# Patient Record
Sex: MALE | Race: WHITE | Employment: OTHER | URBAN - METROPOLITAN AREA
[De-identification: names, ages, dates, MRNs, and addresses within clinical notes are randomized per-mention and may not be internally consistent; named-entity substitution may affect disease eponyms.]

---

## 2017-01-14 RX ORDER — SIMVASTATIN 80 MG/1
TABLET, FILM COATED ORAL
Qty: 30 TAB | Refills: 1 | Status: SHIPPED | OUTPATIENT
Start: 2017-01-14 | End: 2017-06-19 | Stop reason: SDUPTHER

## 2017-06-19 ENCOUNTER — OFFICE VISIT (OUTPATIENT)
Dept: INTERNAL MEDICINE CLINIC | Age: 52
End: 2017-06-19

## 2017-06-19 VITALS
BODY MASS INDEX: 34.16 KG/M2 | SYSTOLIC BLOOD PRESSURE: 136 MMHG | HEART RATE: 67 BPM | RESPIRATION RATE: 16 BRPM | OXYGEN SATURATION: 97 % | TEMPERATURE: 98.1 F | WEIGHT: 205 LBS | HEIGHT: 65 IN | DIASTOLIC BLOOD PRESSURE: 81 MMHG

## 2017-06-19 DIAGNOSIS — E55.9 VITAMIN D DEFICIENCY: ICD-10-CM

## 2017-06-19 DIAGNOSIS — E78.1 HYPERTRIGLYCERIDEMIA: ICD-10-CM

## 2017-06-19 DIAGNOSIS — E66.9 NON MORBID OBESITY, UNSPECIFIED OBESITY TYPE: ICD-10-CM

## 2017-06-19 DIAGNOSIS — M1A.9XX0 CHRONIC GOUT INVOLVING TOE OF LEFT FOOT WITHOUT TOPHUS, UNSPECIFIED CAUSE: ICD-10-CM

## 2017-06-19 DIAGNOSIS — E78.00 HYPERCHOLESTEROLEMIA: Primary | ICD-10-CM

## 2017-06-19 RX ORDER — SIMVASTATIN 80 MG/1
TABLET, FILM COATED ORAL
Qty: 30 TAB | Refills: 5 | Status: SHIPPED | OUTPATIENT
Start: 2017-06-19 | End: 2018-02-13 | Stop reason: SDUPTHER

## 2017-06-19 NOTE — PROGRESS NOTES
HISTORY OF PRESENT ILLNESS  Nemesio Daniels is a 54 y.o. male. Pt. comes in after 9 months for f/u. Has a few medical problems. Has lost more weight and remains on zocor for hyperlipidemia. Med list and most recent labs/studies reviewed with pt. His most recent lipids were higher because he stopped taking his Zocor. BS was high too. A1c in office was 5. 3. Trying to be active physically including weightlifting. Reports pain in her right shoulder from time to time. He saw a chiropractor and feels better. No recent gout attacks. Vitamin D has been low in the past.  Needs refills and repeat labs. Reports no other new c/o. Cholesterol Problem   Pertinent negatives include no chest pain, no abdominal pain, no headaches and no shortness of breath. Shoulder Pain      Follow Up Chronic Condition   Pertinent negatives include no chest pain, no abdominal pain, no headaches and no shortness of breath. Review of Systems   Constitutional: Negative. Eyes: Negative for blurred vision. Respiratory: Negative for shortness of breath. Cardiovascular: Negative for chest pain and leg swelling. Gastrointestinal: Negative for abdominal pain. Genitourinary: Negative for dysuria. Musculoskeletal: Positive for joint pain. Negative for falls. Skin: Negative for rash. Neurological: Negative for dizziness, sensory change and headaches. Endo/Heme/Allergies: Negative. Negative for polydipsia. Psychiatric/Behavioral: Negative for depression. All other systems reviewed and are negative. Physical Exam   Constitutional: He is oriented to person, place, and time. He appears well-developed and well-nourished. No distress. Pleasant  Obese   HENT:   Head: Normocephalic and atraumatic. Mouth/Throat: Oropharynx is clear and moist.   Eyes: Conjunctivae are normal. No scleral icterus. Neck: Normal range of motion. Neck supple. No JVD present. No thyromegaly present.    Cardiovascular: Normal rate, regular rhythm, normal heart sounds and intact distal pulses. No murmur heard. Pulmonary/Chest: Effort normal and breath sounds normal. No respiratory distress. He has no wheezes. He has no rales. Abdominal: Soft. Bowel sounds are normal. He exhibits no distension. There is no tenderness. obese   Musculoskeletal: Normal range of motion. He exhibits no edema or tenderness. Neurological: He is alert and oriented to person, place, and time. Coordination normal.   Skin: Skin is warm and dry. No rash noted. Psychiatric: He has a normal mood and affect. His behavior is normal.   Nursing note and vitals reviewed. ASSESSMENT and PLAN  Sami Quinn was seen today for cholesterol problem, shoulder pain and follow up chronic condition. Diagnoses and all orders for this visit:    Hypercholesterolemia  -     METABOLIC PANEL, COMPREHENSIVE  -     LIPID PANEL  -     PSA - PROSTATE SPECIFIC AG    Hypertriglyceridemia  -     METABOLIC PANEL, COMPREHENSIVE  -     LIPID PANEL    Non morbid obesity, unspecified obesity type    Vitamin D deficiency  -     VITAMIN D, 25 HYDROXY    Chronic gout involving toe of left foot without tophus, unspecified cause  -     URIC ACID    Other orders  -     simvastatin (ZOCOR) 80 mg tablet; TAKE 1 TABLET BY MOUTH EVERY NIGHT AT BEDTIME      Follow-up Disposition:  Return in about 6 months (around 12/19/2017).    lab results and schedule of future lab studies reviewed with patient  reviewed diet, exercise and weight control  reviewed medications and side effects in detail  Advised him to warm up and cool down after weight lifting

## 2017-06-19 NOTE — PROGRESS NOTES
There are no preventive care reminders to display for this patient. Chief Complaint   Patient presents with    Cholesterol Problem    Shoulder Pain     Right shoulder       1. Have you been to the ER, urgent care clinic since your last visit? Hospitalized since your last visit? No    2. Have you seen or consulted any other health care providers outside of the [address] [place] [address] since your last visit? Include any pap smears or colon screening. No        Patient is accompanied by self I have received verbal consent from Luz Maria Angulo to discuss any/all medical information while they are present in the room.

## 2017-06-19 NOTE — MR AVS SNAPSHOT
Visit Information Date & Time Provider Department Dept. Phone Encounter #  
 6/19/2017 10:00 AM Christopher Larose, [facility] Internal Medicine [phone] [id number] Follow-up Instructions Return in about 6 months (around 12/19/2017). Upcoming Health Maintenance Date Due INFLUENZA AGE 9 TO ADULT 8/1/2017 COLONOSCOPY 9/1/2020 DTaP/Tdap/Td series (2 - Td) 10/15/2022 Allergies as of 6/19/2017  Review Complete On: 6/19/2017 By: Christopher Larose, DO No Known Allergies Current Immunizations  Reviewed on 12/7/2015 Name Date Influenza Vaccine Intradermal PF 11/24/2015 12:02 PM  
 Pneumococcal Polysaccharide (PPSV-23) 12/8/2015  9:39 AM  
 TDAP Vaccine 10/15/2012 Not reviewed this visit You Were Diagnosed With   
  
 Codes Comments Hypercholesterolemia    -  Primary ICD-10-CM: E78.00 ICD-9-CM: 272.0 Hypertriglyceridemia     ICD-10-CM: E78.1 ICD-9-CM: 272.1 Non morbid obesity, unspecified obesity type     ICD-10-CM: E66.9 ICD-9-CM: 278.00 Vitamin D deficiency     ICD-10-CM: E55.9 ICD-9-CM: 268.9 Chronic gout involving toe of left foot without tophus, unspecified cause     ICD-10-CM: M1A. 9XX0 
ICD-9-CM: 274.02 Vitals BP Pulse Temp Resp Height(growth percentile) Weight(growth percentile) 136/81 (BP 1 Location: Right arm, BP Patient Position: Sitting) 67 98.1 °F (36.7 °C) (Oral) 16 5' 5\" (1.651 m) 205 lb (93 kg) SpO2 BMI Smoking Status 97% 34.11 kg/m2 Former Smoker Vitals History BMI and BSA Data Body Mass Index Body Surface Area  
 34.11 kg/m 2 2.07 m 2 Preferred Pharmacy Pharmacy Name Phone [id number], [address] [place] [address] [phone] Your Updated Medication List  
  
   
This list is accurate as of: 6/19/17 10:34 AM.  Always use your most recent med list.  
  
  
  
  
 FISH  mg Cap Generic drug:  Omega-3 Fatty Acids Take  by mouth.  
  
 multivitamin tablet Commonly known as:  ONE A DAY Take 1 Tab by mouth daily. simvastatin 80 mg tablet Commonly known as:  ZOCOR  
TAKE 1 TABLET BY MOUTH EVERY NIGHT AT BEDTIME  
  
 VITAMIN D3 2,000 unit Tab Generic drug:  cholecalciferol (vitamin D3) Take  by mouth. 3 x weekly Prescriptions Sent to Pharmacy Refills  
 simvastatin (ZOCOR) 80 mg tablet 5 Sig: TAKE 1 TABLET BY MOUTH EVERY NIGHT AT BEDTIME Class: Normal  
 Pharmacy: [facility]/pharmacy #[address] [place] [address] #: [phone] We Performed the Following LIPID PANEL [62698 CPT(R)] METABOLIC PANEL, COMPREHENSIVE [50683 CPT(R)] PROSTATE SPECIFIC AG (PSA) B2406172 CPT(R)] URIC ACID [id number] CPT(R)] VITAMIN D, 25 HYDROXY [id number] CPT(R)] Follow-up Instructions Return in about 6 months (around 12/19/2017). Introducing [facility]! Dear Garima Patterson: 
Thank you for requesting a D square nv account. Our records indicate that you already have an active D square nv account. You can access your account anytime at [email or web address] Did you know that you can access your hospital and ER discharge instructions at any time in D square nv? You can also review all of your test results from your hospital stay or ER visit. Additional Information If you have questions, please visit the Frequently Asked Questions section of the D square nv website at [email or web address] Remember, D square nv is NOT to be used for urgent needs. For medical emergencies, dial 911. Now available from your iPhone and Android! Please provide this summary of care documentation to your next provider. Your primary care clinician is listed as Mikayla Leal. If you have any questions after today's visit, please call [phone].

## 2018-01-15 DIAGNOSIS — E55.9 VITAMIN D DEFICIENCY: ICD-10-CM

## 2018-02-13 ENCOUNTER — OFFICE VISIT (OUTPATIENT)
Dept: INTERNAL MEDICINE CLINIC | Age: 53
End: 2018-02-13

## 2018-02-13 VITALS
HEART RATE: 85 BPM | HEIGHT: 65 IN | BODY MASS INDEX: 36.06 KG/M2 | OXYGEN SATURATION: 96 % | WEIGHT: 216.4 LBS | SYSTOLIC BLOOD PRESSURE: 114 MMHG | DIASTOLIC BLOOD PRESSURE: 51 MMHG | TEMPERATURE: 98.8 F | RESPIRATION RATE: 18 BRPM

## 2018-02-13 DIAGNOSIS — E78.00 HYPERCHOLESTEROLEMIA: ICD-10-CM

## 2018-02-13 DIAGNOSIS — J06.9 URTI (ACUTE UPPER RESPIRATORY INFECTION): Primary | ICD-10-CM

## 2018-02-13 RX ORDER — METHYLPREDNISOLONE 4 MG/1
TABLET ORAL
Qty: 1 DOSE PACK | Refills: 0 | Status: SHIPPED | OUTPATIENT
Start: 2018-02-13 | End: 2018-07-24

## 2018-02-13 RX ORDER — DOXYCYCLINE 100 MG/1
100 TABLET ORAL 2 TIMES DAILY
Qty: 14 TAB | Refills: 0 | Status: SHIPPED | OUTPATIENT
Start: 2018-02-13 | End: 2018-07-24

## 2018-02-13 RX ORDER — SIMVASTATIN 80 MG/1
TABLET, FILM COATED ORAL
Qty: 30 TAB | Refills: 5 | Status: SHIPPED | OUTPATIENT
Start: 2018-02-13 | End: 2018-07-24 | Stop reason: SDUPTHER

## 2018-02-13 NOTE — PROGRESS NOTES
Health Maintenance Due   Topic Date Due    Influenza Age 5 to Adult  08/01/2017       Chief Complaint   Patient presents with    Cough     Fatigue,     Pneumonia     History of       1. Have you been to the ER, urgent care clinic since your last visit? Hospitalized since your last visit? No    2. Have you seen or consulted any other health care providers outside of the [address] [place] [address] since your last visit? Include any pap smears or colon screening. No    3) Do you have an Advance Directive on file? no    4) Are you interested in receiving information on Advance Directives? NO      Patient is accompanied by self I have received verbal consent from Ольга Bobby to discuss any/all medical information while they are present in the room.

## 2018-02-13 NOTE — PATIENT INSTRUCTIONS
Upper Respiratory Infection (Cold): Care Instructions  Your Care Instructions    An upper respiratory infection, or URI, is an infection of the nose, sinuses, or throat. URIs are spread by coughs, sneezes, and direct contact. The common cold is the most frequent kind of URI. The flu and sinus infections are other kinds of URIs. Almost all URIs are caused by viruses. Antibiotics won't cure them. But you can treat most infections with home care. This may include drinking lots of fluids and taking over-the-counter pain medicine. You will probably feel better in 4 to 10 days. The doctor has checked you carefully, but problems can develop later. If you notice any problems or new symptoms, get medical treatment right away. Follow-up care is a key part of your treatment and safety. Be sure to make and go to all appointments, and call your doctor if you are having problems. It's also a good idea to know your test results and keep a list of the medicines you take. How can you care for yourself at home? · To prevent dehydration, drink plenty of fluids, enough so that your urine is light yellow or clear like water. Choose water and other caffeine-free clear liquids until you feel better. If you have kidney, heart, or liver disease and have to limit fluids, talk with your doctor before you increase the amount of fluids you drink. · Take an over-the-counter pain medicine, such as acetaminophen (Tylenol), ibuprofen (Advil, Motrin), or naproxen (Aleve). Read and follow all instructions on the label. · Before you use cough and cold medicines, check the label. These medicines may not be safe for young children or for people with certain health problems. · Be careful when taking over-the-counter cold or flu medicines and Tylenol at the same time. Many of these medicines have acetaminophen, which is Tylenol. Read the labels to make sure that you are not taking more than the recommended dose.  Too much acetaminophen (Tylenol) can be harmful. · Get plenty of rest.  · Do not smoke or allow others to smoke around you. If you need help quitting, talk to your doctor about stop-smoking programs and medicines. These can increase your chances of quitting for good. When should you call for help? Call 911 anytime you think you may need emergency care. For example, call if:  ? · You have severe trouble breathing. ?Call your doctor now or seek immediate medical care if:  ? · You seem to be getting much sicker. ? · You have new or worse trouble breathing. ? · You have a new or higher fever. ? · You have a new rash. ? Watch closely for changes in your health, and be sure to contact your doctor if:  ? · You have a new symptom, such as a sore throat, an earache, or sinus pain. ? · You cough more deeply or more often, especially if you notice more mucus or a change in the color of your mucus. ? · You do not get better as expected. Where can you learn more? Go to http://honey-fatou.info/. Enter X090 in the search box to learn more about \"Upper Respiratory Infection (Cold): Care Instructions. \"  Current as of: May 12, 2017  Content Version: 11.4  © 8870-2745 ivWatch. Care instructions adapted under license by [employer] (which disclaims liability or warranty for this information). If you have questions about a medical condition or this instruction, always ask your healthcare professional. [place] [address] any warranty or liability for your use of this information. High Cholesterol: Care Instructions  Your Care Instructions    Cholesterol is a type of fat in your blood. It is needed for many body functions, such as making new cells. Cholesterol is made by your body. It also comes from food you eat. High cholesterol means that you have too much of the fat in your blood. This raises your risk of a heart attack and stroke.   LDL and HDL are part of your total cholesterol. LDL is the \"bad\" cholesterol. High LDL can raise your risk for heart disease, heart attack, and stroke. HDL is the \"good\" cholesterol. It helps clear bad cholesterol from the body. High HDL is linked with a lower risk of heart disease, heart attack, and stroke. Your cholesterol levels help your doctor find out your risk for having a heart attack or stroke. You and your doctor can talk about whether you need to lower your risk and what treatment is best for you. A heart-healthy lifestyle along with medicines can help lower your cholesterol and your risk. The way you choose to lower your risk will depend on how high your risk is for heart attack and stroke. It will also depend on how you feel about taking medicines. Follow-up care is a key part of your treatment and safety. Be sure to make and go to all appointments, and call your doctor if you are having problems. It's also a good idea to know your test results and keep a list of the medicines you take. How can you care for yourself at home? · Eat a variety of foods every day. Good choices include fruits, vegetables, whole grains (like oatmeal), dried beans and peas, nuts and seeds, soy products (like tofu), and fat-free or low-fat dairy products. · Replace butter, margarine, and hydrogenated or partially hydrogenated oils with olive and canola oils. (Canola oil margarine without trans fat is fine.)  · Replace red meat with fish, poultry, and soy protein (like tofu). · Limit processed and packaged foods like chips, crackers, and cookies. · Bake, broil, or steam foods. Don't diaz them. · Be physically active. Get at least 30 minutes of exercise on most days of the week. Walking is a good choice. You also may want to do other activities, such as running, swimming, cycling, or playing tennis or team sports. · Stay at a healthy weight or lose weight by making the changes in eating and physical activity listed above.  Losing just a small amount of weight, even 5 to 10 pounds, can reduce your risk for having a heart attack or stroke. · Do not smoke. When should you call for help? Watch closely for changes in your health, and be sure to contact your doctor if:  ? · You need help making lifestyle changes. ? · You have questions about your medicine. Where can you learn more? Go to http://honey-fatou.info/. Enter R932 in the search box to learn more about \"High Cholesterol: Care Instructions. \"  Current as of: September 21, 2016  Content Version: 11.4  © 5258-0971 [employer]ite[employer]. Care instructions adapted under license by Snacks[employer] (which disclaims liability or warranty for this information). If you have questions about a medical condition or this instruction, always ask your healthcare professional. [address] any warranty or liability for your use of this information.

## 2018-02-13 NOTE — PROGRESS NOTES
Subjective:     Chief Complaint   Patient presents with    Cough     Fatigue,     Pneumonia     History of       History of Present Illness    Josselin Browning is a 50y.o. year old male who is a patient of Dr. Ghazal Carter that presents today with complaints of cough and fatigue. He reports a hx of pneumonia with similar symptoms. He denies any fever. Onset 4 days ago. He denies any sick contacts. NAD. He denies wheezing or SOB.  +tan colored sputum. No other medical complaints today. He has a hx of hypercholesterolemia and is taking simvastatin. He is requesting a refill. No CP, SOB, GI, or  symptoms. Reviewed medications, recent lab work and imaging with patient. Pt reports compliance with medications. Current Outpatient Prescriptions on File Prior to Visit   Medication Sig Dispense Refill    Omega-3 Fatty Acids (FISH OIL) 300 mg cap Take  by mouth.  simvastatin (ZOCOR) 80 mg tablet TAKE 1 TABLET BY MOUTH EVERY NIGHT AT BEDTIME 30 Tab 5    multivitamin (ONE A DAY) tablet Take 1 Tab by mouth daily.  cholecalciferol, vitamin D3, (VITAMIN D3) 2,000 unit tab Take  by mouth. 3 x weekly       No current facility-administered medications on file prior to visit.         No Known Allergies   Past Medical History:   Diagnosis Date    Elevated PSA     Hypercholesterolemia     Seasonal allergies       Past Surgical History:   Procedure Laterality Date    ENDOSCOPY, COLON, DIAGNOSTIC      10, due 15 v 20      Social History   Substance Use Topics    Smoking status: Former Smoker    Smokeless tobacco: Never Used    Alcohol use 1.5 oz/week     3 Glasses of wine per week      Comment: 3 per week      Family History   Problem Relation Age of Onset    Cancer Father      pancreatic    Cancer Paternal Grandmother      lung        Objective:     Vitals:    02/13/18 1511   BP: 114/51   Pulse: 85   Resp: 18   Temp: 98.8 °F (37.1 °C)   TempSrc: Oral   SpO2: 96%   Weight: 216 lb 6.4 oz (98.2 kg) Height: 5' 5\" (1.651 m)       Review of Systems   Constitutional: Positive for malaise/fatigue. HENT: Positive for congestion. Eyes: Negative. Respiratory: Positive for cough and sputum production. Negative for shortness of breath and wheezing. Cardiovascular: Negative. Gastrointestinal: Negative. Genitourinary: Negative. Musculoskeletal: Negative. Skin: Negative. Neurological: Negative. Psychiatric/Behavioral: Negative. Physical Exam   Constitutional: He is oriented to person, place, and time. He appears well-developed and well-nourished. No distress. Well-appearing  male. NAD   HENT:   Head: Normocephalic and atraumatic. Right Ear: External ear normal.   Left Ear: External ear normal.   Mouth/Throat: Oropharynx is clear and moist.   Eyes: EOM are normal. Pupils are equal, round, and reactive to light. Neck: Normal range of motion. Neck supple. Cardiovascular: Normal rate, regular rhythm and normal heart sounds. Pulmonary/Chest: Effort normal. No respiratory distress. He has no wheezes. He has rhonchi in the right lower field. Dry cough. Abdominal: Soft. Bowel sounds are normal. He exhibits no distension. There is no tenderness. Musculoskeletal: Normal range of motion. He exhibits no edema, tenderness or deformity. Neurological: He is alert and oriented to person, place, and time. Skin: Skin is warm and dry. No rash noted. He is not diaphoretic. No erythema. No pallor. Psychiatric: He has a normal mood and affect. His behavior is normal.   Nursing note and vitals reviewed. Assessment/ Plan:   Diagnoses and all orders for this visit:    1. URTI (acute upper respiratory infection)   Will order  -     doxycycline (ADOXA) 100 mg tablet; Take 1 Tab by mouth two (2) times a day. -     methylPREDNISolone (MEDROL DOSEPACK) 4 mg tablet; Use as directed    2.  Hypercholesterolemia   Will order  -     simvastatin (ZOCOR) 80 mg tablet; TAKE 1 TABLET BY MOUTH EVERY NIGHT AT BEDTIME    Patient's plan of care has been reviewed with them. Patient and/or family have verbally conveyed their understanding and agreement of the patient's signs, symptoms, diagnosis, treatment and prognosis and additionally agree to follow up as recommended or return to [facility] Internal Medicine should their condition change prior to follow-up. Discharge instructions have also been provided to the patient with some educational information regarding their diagnosis as well a list of reasons why they would want to return to the office prior to their follow-up appointment should their condition change.     Follow-up with Dr. Eduardo Tobias as scheduled

## 2018-02-13 NOTE — MR AVS SNAPSHOT
[address] [facility] [address] 
[phone] Patient: Gregorio Sanchez MRN: [id number] :1965 Visit Information Date & Time Provider Department Dept. Phone Encounter #  
 2018  2:40 PM Brayan Guillen NP [facility] Internal Medicine [phone] [id number] Your Appointments 3/13/2018  2:00 PM  
PHYSICAL PRE OP with Amy Rosa DO [facility] Internal Medicine ([address]) Appt Note: CPE; [address] [facility] [address] [facility] [address] [place] [id number] [phone]  
  
   
 [address] [facility] [address] Upcoming Health Maintenance Date Due Influenza Age 5 to Adult 2017 COLONOSCOPY 2020 DTaP/Tdap/Td series (2 - Td) 10/15/2022 Allergies as of 2018  Review Complete On: 2018 By: Brayan Guillen NP No Known Allergies Current Immunizations  Reviewed on 2015 Name Date Influenza Vaccine Intradermal PF 2015 12:02 PM  
 Pneumococcal Polysaccharide (PPSV-23) 2015  9:39 AM  
 TDAP Vaccine 10/15/2012 Not reviewed this visit You Were Diagnosed With   
  
 Codes Comments URTI (acute upper respiratory infection)    -  Primary ICD-10-CM: J06.9 ICD-9-CM: 465.9 Hypercholesterolemia     ICD-10-CM: E78.00 ICD-9-CM: 272.0 Vitals BP Pulse Temp Resp Height(growth percentile) Weight(growth percentile) 114/51 (BP 1 Location: Left arm, BP Patient Position: Sitting) 85 98.8 °F (37.1 °C) (Oral) 18 5' 5\" (1.651 m) 216 lb 6.4 oz (98.2 kg) SpO2 BMI Smoking Status 96% 36.01 kg/m2 Former Smoker Vitals History BMI and BSA Data Body Mass Index Body Surface Area 36.01 kg/m 2 2.12 m 2 Preferred Pharmacy Pharmacy Name Phone [facility]/PHARMACY #[id number] [phone] Your Updated Medication List  
  
   
This list is accurate as of: 2/13/18  3:33 PM.  Always use your most recent med list.  
  
  
  
  
 doxycycline 100 mg tablet Commonly known as:  ADOXA Take 1 Tab by mouth two (2) times a day. FISH  mg Cap Generic drug:  Omega-3 Fatty Acids Take  by mouth.  
  
 methylPREDNISolone 4 mg tablet Commonly known as:  Valere Cera Use as directed  
  
 multivitamin tablet Commonly known as:  ONE A DAY Take 1 Tab by mouth daily. simvastatin 80 mg tablet Commonly known as:  ZOCOR  
TAKE 1 TABLET BY MOUTH EVERY NIGHT AT BEDTIME  
  
 VITAMIN D3 2,000 unit Tab Generic drug:  cholecalciferol (vitamin D3) Take  by mouth. 3 x weekly Prescriptions Sent to Pharmacy Refills  
 doxycycline (ADOXA) 100 mg tablet 0 Sig: Take 1 Tab by mouth two (2) times a day. Class: Normal  
 Pharmacy: [facility]/pharmacy #[address] [place] [address] Ph #: [phone] Route: Oral  
 methylPREDNISolone (MEDROL DOSEPACK) 4 mg tablet 0 Sig: Use as directed Class: Normal  
 Pharmacy: [facility]/pharmacy #[address] [place] [address] Ph #: [phone]  
 simvastatin (ZOCOR) 80 mg tablet 5 Sig: TAKE 1 TABLET BY MOUTH EVERY NIGHT AT BEDTIME Class: Normal  
 Pharmacy: [facility]/pharmacy #[address] [place] [address] Ph #: [phone] [facility]! Dear Kvng Godwin: 
Thank you for requesting a DanceOn account. Our records indicate that you already have an active DanceOn account. You can access your account anytime at [email or web address] Did you know that you can access your hospital and ER discharge instructions at any time in DanceOn? You can also review all of your test results from your hospital stay or ER visit. Additional Information If you have questions, please visit the Frequently Asked Questions section of the INBEP website at [email or web address] Remember, INBEP is NOT to be used for urgent needs. For medical emergencies, dial 911. Now available from your iPhone and Android! Please provide this summary of care documentation to your next provider. Your primary care clinician is listed as Ce Marie. If you have any questions after today's visit, please call [phone].

## 2018-07-24 ENCOUNTER — OFFICE VISIT (OUTPATIENT)
Dept: INTERNAL MEDICINE CLINIC | Age: 53
End: 2018-07-24

## 2018-07-24 VITALS
OXYGEN SATURATION: 97 % | RESPIRATION RATE: 20 BRPM | SYSTOLIC BLOOD PRESSURE: 126 MMHG | TEMPERATURE: 96.9 F | HEIGHT: 65 IN | DIASTOLIC BLOOD PRESSURE: 77 MMHG | HEART RATE: 64 BPM | WEIGHT: 210 LBS | BODY MASS INDEX: 34.99 KG/M2

## 2018-07-24 DIAGNOSIS — Z12.83 SKIN CANCER SCREENING: ICD-10-CM

## 2018-07-24 DIAGNOSIS — Z12.11 SCREENING FOR COLON CANCER: ICD-10-CM

## 2018-07-24 DIAGNOSIS — E78.00 HYPERCHOLESTEROLEMIA: Primary | ICD-10-CM

## 2018-07-24 DIAGNOSIS — E66.09 CLASS 1 OBESITY DUE TO EXCESS CALORIES WITHOUT SERIOUS COMORBIDITY WITH BODY MASS INDEX (BMI) OF 34.0 TO 34.9 IN ADULT: ICD-10-CM

## 2018-07-24 DIAGNOSIS — G47.30 SLEEP APNEA, UNSPECIFIED TYPE: ICD-10-CM

## 2018-07-24 RX ORDER — SIMVASTATIN 80 MG/1
TABLET, FILM COATED ORAL
Qty: 30 TAB | Refills: 5 | Status: SHIPPED | OUTPATIENT
Start: 2018-07-24 | End: 2019-08-02 | Stop reason: SDUPTHER

## 2018-07-24 NOTE — PROGRESS NOTES
Subjective:     Chief Complaint   Patient presents with    Complete Physical    Results     lab results. History of Present Illness    Maximo Hernandez is a 50y.o. year old male who is a patient of Dr. Ifeoma Serrano that presents today for follow-up. Pt has a hx of hypercholesterolemia. He is labs from yesterday show elevated total cholesterol, LDL, and triglycerides. He states he has not been taking his simvastatin as prescribed. He is concerned about his liver function. AST and ALT were both WNL. Patient educated on the importance of taking his statin along with diet and exercise. He reports being concerned about his liver due to his father dying at 64 from liver and pancreatic cancer. He also states that his wife has been telling him that he gasps for air while he is sleeping. He denies snoring. He denies a hx of sleep apnea. NAD. He denies any other new complaints at this time. No CP, SOB, GI, or  symptoms. Reviewed medications, recent lab work and imaging with patient. Current Outpatient Prescriptions on File Prior to Visit   Medication Sig Dispense Refill    Omega-3 Fatty Acids (FISH OIL) 300 mg cap Take  by mouth.  multivitamin (ONE A DAY) tablet Take 1 Tab by mouth daily.  cholecalciferol, vitamin D3, (VITAMIN D3) 2,000 unit tab Take  by mouth. 3 x weekly       No current facility-administered medications on file prior to visit.         No Known Allergies   Past Medical History:   Diagnosis Date    Elevated PSA     Hypercholesterolemia     Seasonal allergies       Past Surgical History:   Procedure Laterality Date    ENDOSCOPY, COLON, DIAGNOSTIC      10, due 15 v 20      Social History   Substance Use Topics    Smoking status: Former Smoker     Quit date: 7/24/2003    Smokeless tobacco: Never Used    Alcohol use 1.5 oz/week     3 Glasses of wine per week      Comment: 3 per week      Family History   Problem Relation Age of Onset    Cancer Father      pancreatic  Cancer Paternal Grandmother      lung        Objective:     Vitals:    07/24/18 1335   BP: 126/77   Pulse: 64   Resp: 20   Temp: 96.9 °F (36.1 °C)   TempSrc: Oral   SpO2: 97%   Weight: 210 lb (95.3 kg)   Height: 5' 5\" (1.651 m)       Review of Systems   Constitutional: Negative. HENT: Negative. Eyes: Negative. Respiratory: Negative. Cardiovascular: Negative. Gastrointestinal: Negative. Genitourinary: Negative. Musculoskeletal: Negative. Skin: Negative. Neurological: Negative. Psychiatric/Behavioral: Negative. Physical Exam   Constitutional: He is oriented to person, place, and time. He appears well-developed and well-nourished. No distress. Well-appearing obese  male. NAD   HENT:   Head: Normocephalic and atraumatic. Eyes: Conjunctivae and EOM are normal. Pupils are equal, round, and reactive to light. Neck: Normal range of motion. Neck supple. Cardiovascular: Normal rate, regular rhythm and normal heart sounds. Pulmonary/Chest: Effort normal and breath sounds normal. No respiratory distress. He has no wheezes. Abdominal: Soft. Bowel sounds are normal. He exhibits no distension. There is no tenderness. Musculoskeletal: Normal range of motion. He exhibits no edema, tenderness or deformity. Neurological: He is alert and oriented to person, place, and time. Skin: Skin is warm and dry. No rash noted. He is not diaphoretic. No erythema. No pallor. Psychiatric: He has a normal mood and affect. His behavior is normal.   Nursing note and vitals reviewed. Assessment/ Plan:   Diagnoses and all orders for this visit:    1. Hypercholesterolemia   Will order  -     simvastatin (ZOCOR) 80 mg tablet; TAKE 1 TABLET BY MOUTH EVERY NIGHT AT BEDTIME   Recommend that patient watch diet for fatty foods and exercise as tolerated.     Encourage patient to lose weight, losing 5-10 pounds can lower triglycerides by 20%, cut sugar, increase fiber intake, limit alcohol, exercise as tolerated, and cut back on starches. 2. Screening for colon cancer   Will order  -     [facility]    3. Skin cancer screening   Will order  -     REFERRAL TO DERMATOLOGY    4. Sleep apnea, unspecified type   Will order  -     REFERRAL TO SLEEP STUDIES    5. Class 1 obesity due to excess calories without serious comorbidity with body mass index (BMI) of 34.0 to 34.9 in adult   1. Consume 3 meals per day, do not skip meals. 2. Chose low fat, portion controlled foods for snack and desserts such as low fat chocolate pudding, low fat flavored yogurt, 100 calorie snack packs, etc.   3. Increase moderate intensity exercise to 30 minutes at least 5 times per week. 4. Read nutrition facts labels to identify foods that are lean, extra lean and low fat  The patient is asked to make an attempt to improve diet and exercise patterns to aid in medical management of this problem   Encourage patient to eat a healther Mediterranean style diet with 55% or less of calories from carbohydrates. Try to eat more vegetables, whole fruit, nuts, whole grains, yogurt and less refined grains. Eat less red meat. Chicken and fish would be good protein sources. Aim to eat less than 45 grams of carbohydrates per meal.    Patient's plan of care has been reviewed with them. Patient and/or family have verbally conveyed their understanding and agreement of the patient's signs, symptoms, diagnosis, treatment and prognosis and additionally agree to follow up as recommended or return to [facility] should their condition change prior to follow-up. Discharge instructions have also been provided to the patient with some educational information regarding their diagnosis as well a list of reasons why they would want to return to the office prior to their follow-up appointment should their condition change. Follow-up with Dr. Ifeoma Serrano in 6 months.

## 2018-07-24 NOTE — PATIENT INSTRUCTIONS
Colon Cancer Screening: Care Instructions  Your Care Instructions    Colorectal cancer occurs in the colon or rectum. That's the lower part of your digestive system. It is the second-leading cause of cancer deaths in the [place]. It often starts with small growths called polyps in the colon or rectum. Polyps are usually found with screening tests. Depending on the type of test, any polyps found may be removed during the tests. Colorectal cancer usually does not cause symptoms at first. But regular tests can help find it early, before it spreads and becomes harder to treat. Experts advise routine tests for colon cancer for people starting at age 48. And they advise people with a higher risk of colon cancer to get tested sooner. Talk with your doctor about when you should start testing. Discuss which tests you need. Follow-up care is a key part of your treatment and safety. Be sure to make and go to all appointments, and call your doctor if you are having problems. It's also a good idea to know your test results and keep a list of the medicines you take. What are the main screening tests for colon cancer? · Stool tests. These include the fecal immunochemical test (FIT) and the fecal occult blood test (FOBT). These tests check stool samples for signs of cancer. If your test is positive, you will need to have a colonoscopy. · Sigmoidoscopy. This test lets your doctor look at the lining of your rectum and the lowest part of your colon. Your doctor uses a lighted tube called a sigmoidoscope. This test can't find cancers or polyps in the upper part of your colon. In some cases, polyps that are found can be removed. But if your doctor finds polyps, you will need to have a colonoscopy to check the upper part of your colon. · Colonoscopy. This test lets your doctor look at the lining of your rectum and your entire colon. The doctor uses a thin, flexible tool called a colonoscope.  It can also be used to remove polyps or get a tissue sample (biopsy). What tests do you need? The following guidelines are for people age 48 and over who are not at high risk for colorectal cancer. You may have at least one of these tests as directed by your doctor. · Fecal immunochemical test (FIT) or fecal occult blood test (FOBT) every year  · Sigmoidoscopy every 5 years  · Colonoscopy every 10 years  If you are age 68 to 80, you can work with your doctor to decide if screening is a good option. If you are age 80 or older, your doctor will likely advise that screening is not helpful. Talk with your doctor about when you need to be tested. And discuss which tests are right for you. Your doctor may recommend earlier or more frequent testing if you:  · Have had colorectal cancer before. · Have had colon polyps. · Have symptoms of colorectal cancer. These include blood in your stool and changes in your bowel habits. · Have a parent, brother or sister, or child with colon polyps or colorectal cancer. · Have a bowel disease. This includes ulcerative colitis and Crohn's disease. · Have a rare polyp syndrome that runs in families, such as familial adenomatous polyposis (FAP). · Have had radiation treatments to the belly or pelvis. When should you call for help? Watch closely for changes in your health, and be sure to contact your doctor if:    · You have any changes in your bowel habits.     · You have any problems. Where can you learn more? Go to http://honey-fatou.info/. Enter M541 in the search box to learn more about \"Colon Cancer Screening: Care Instructions. \"  Current as of: May 12, 2017  Content Version: 11.7  © 6373-9406 [employer]. Care instructions adapted under license by [employer] (which disclaims liability or warranty for this information).  If you have questions about a medical condition or this instruction, always ask your healthcare professional. Carly Naidu disclaims any warranty or liability for your use of this information. High Cholesterol: Care Instructions  Your Care Instructions    Cholesterol is a type of fat in your blood. It is needed for many body functions, such as making new cells. Cholesterol is made by your body. It also comes from food you eat. High cholesterol means that you have too much of the fat in your blood. This raises your risk of a heart attack and stroke. LDL and HDL are part of your total cholesterol. LDL is the \"bad\" cholesterol. High LDL can raise your risk for heart disease, heart attack, and stroke. HDL is the \"good\" cholesterol. It helps clear bad cholesterol from the body. High HDL is linked with a lower risk of heart disease, heart attack, and stroke. Your cholesterol levels help your doctor find out your risk for having a heart attack or stroke. You and your doctor can talk about whether you need to lower your risk and what treatment is best for you. A heart-healthy lifestyle along with medicines can help lower your cholesterol and your risk. The way you choose to lower your risk will depend on how high your risk is for heart attack and stroke. It will also depend on how you feel about taking medicines. Follow-up care is a key part of your treatment and safety. Be sure to make and go to all appointments, and call your doctor if you are having problems. It's also a good idea to know your test results and keep a list of the medicines you take. How can you care for yourself at home? · Eat a variety of foods every day. Good choices include fruits, vegetables, whole grains (like oatmeal), dried beans and peas, nuts and seeds, soy products (like tofu), and fat-free or low-fat dairy products. · Replace butter, margarine, and hydrogenated or partially hydrogenated oils with olive and canola oils. (Canola oil margarine without trans fat is fine.)  · Replace red meat with fish, poultry, and soy protein (like tofu).   · Limit processed and packaged foods like chips, crackers, and cookies. · Bake, broil, or steam foods. Don't diaz them. · Be physically active. Get at least 30 minutes of exercise on most days of the week. Walking is a good choice. You also may want to do other activities, such as running, swimming, cycling, or playing tennis or team sports. · Stay at a healthy weight or lose weight by making the changes in eating and physical activity listed above. Losing just a small amount of weight, even 5 to 10 pounds, can reduce your risk for having a heart attack or stroke. · Do not smoke. When should you call for help? Watch closely for changes in your health, and be sure to contact your doctor if:    · You need help making lifestyle changes.     · You have questions about your medicine. Where can you learn more? Go to http://honeyCap Thatfatou.info/. Enter X985 in the search box to learn more about \"High Cholesterol: Care Instructions. \"  Current as of: May 10, 2017  Content Version: 11.7  © 8682-0849 [employer]. Care instructions adapted under license by [employer] (which disclaims liability or warranty for this information). If you have questions about a medical condition or this instruction, always ask your healthcare professional. [address] any warranty or liability for your use of this information. Learning About High Cholesterol  What is high cholesterol? Cholesterol is a type of fat in your blood. It is needed for many body functions, such as making new cells. Cholesterol is made by your body. It also comes from food you eat. If you have too much cholesterol, it starts to build up in your arteries. This is called hardening of the arteries, or atherosclerosis. High cholesterol raises your risk of a heart attack and stroke. There are different types of cholesterol. LDL is the \"bad\" cholesterol.  High LDL can raise your risk for heart disease, heart attack, and stroke. HDL is the \"good\" cholesterol. High HDL is linked with a lower risk for heart disease, heart attack, and stroke. Your cholesterol levels help your doctor find out your risk for having a heart attack or stroke. How can you prevent high cholesterol? A heart-healthy lifestyle can help you prevent high cholesterol. This lifestyle helps lower your risk for a heart attack and stroke. · Eat heart-healthy foods. ¨ Eat fruits, vegetables, whole grains (like oatmeal), dried beans and peas, nuts and seeds, soy products (like tofu), and fat-free or low-fat dairy products. ¨ Replace butter, margarine, and hydrogenated or partially hydrogenated oils with olive and canola oils. (Canola oil margarine without trans fat is fine.)  ¨ Replace red meat with fish, poultry, and soy protein (like tofu). ¨ Limit processed and packaged foods like chips, crackers, and cookies. · Be active. Exercise can improve your cholesterol level. Get at least 30 minutes of exercise on most days of the week. Walking is a good choice. You also may want to do other activities, such as running, swimming, cycling, or playing tennis or team sports. · Stay at a healthy weight. Lose weight if you need to. · Don't smoke. If you need help quitting, talk to your doctor about stop-smoking programs and medicines. These can increase your chances of quitting for good. How is high cholesterol treated? The goal of treatment is to reduce your chances of having a heart attack or stroke. The goal is not to lower your cholesterol numbers only. · You may make lifestyle changes, such as eating healthy foods, not smoking, losing weight, and being more active. · You may have to take medicine. Follow-up care is a key part of your treatment and safety. Be sure to make and go to all appointments, and call your doctor if you are having problems. It's also a good idea to know your test results and keep a list of the medicines you take.   Where can you learn more? Go to http://honey-fatou.info/. Enter U216 in the search box to learn more about \"Learning About High Cholesterol. \"  Current as of: May 10, 2017  Content Version: 11.7  © 8900-8485 [employer]. Care instructions adapted under license by hike (which disclaims liability or warranty for this information). If you have questions about a medical condition or this instruction, always ask your healthcare professional. [place] [address] any warranty or liability for your use of this information.

## 2018-07-24 NOTE — PROGRESS NOTES
Chief Complaint   Patient presents with    Complete Physical    Results     lab results. 1. Have you been to the ER, urgent care clinic since your last visit? Hospitalized since your last visit? No    2. Have you seen or consulted any other health care providers outside of the [address] [place] [address] since your last visit? Include any pap smears or colon screening.  No

## 2018-07-24 NOTE — MR AVS SNAPSHOT
[address] [facility] [address] [place] [address] 
[phone] Patient: Young Rico MRN: [id number] :1965 Visit Information Date & Time Provider Department Dept. Phone Encounter #  
 2018  1:20 PM Gui Tillman, [address] [facility] [phone] [id number] Upcoming Health Maintenance Date Due Influenza Age 5 to Adult 2018 COLONOSCOPY 2020 DTaP/Tdap/Td series (2 - Td) 10/15/2022 Allergies as of 2018  Review Complete On: 2018 By: Gui Tillman NP No Known Allergies Current Immunizations  Reviewed on 2015 Name Date Influenza Vaccine Intradermal PF 2015 12:02 PM  
 Pneumococcal Polysaccharide (PPSV-23) 2015  9:39 AM  
 TDAP Vaccine 10/15/2012 Not reviewed this visit You Were Diagnosed With   
  
 Codes Comments Hypercholesterolemia    -  Primary ICD-10-CM: E78.00 ICD-9-CM: 272.0 Screening for colon cancer     ICD-10-CM: Z12.11 ICD-9-CM: V76.51 Skin cancer screening     ICD-10-CM: Z12.83 ICD-9-CM: V76.43 Sleep apnea, unspecified type     ICD-10-CM: G47.30 ICD-9-CM: 780.57 Vitals BP Pulse Temp Resp Height(growth percentile) Weight(growth percentile) 126/77 (BP 1 Location: Right arm, BP Patient Position: Sitting) 64 96.9 °F (36.1 °C) (Oral) 20 5' 5\" (1.651 m) 210 lb (95.3 kg) SpO2 BMI Smoking Status 97% 34.95 kg/m2 Former Smoker BMI and BSA Data Body Mass Index Body Surface Area 34.95 kg/m 2 2.09 m 2 Preferred Pharmacy Pharmacy Name Phone [id number] [address] [phone] Your Updated Medication List  
  
   
This list is accurate as of 18  2:19 PM.  Always use your most recent med list.  
  
  
  
  
 FISH  mg Cap Generic drug:  Omega-3 Fatty Acids Take  by mouth.  
  
 multivitamin tablet Commonly known as:  ONE A DAY Take 1 Tab by mouth daily. simvastatin 80 mg tablet Commonly known as:  ZOCOR  
TAKE 1 TABLET BY MOUTH EVERY NIGHT AT BEDTIME  
  
 VITAMIN D3 2,000 unit Tab Generic drug:  cholecalciferol (vitamin D3) Take  by mouth. 3 x weekly Prescriptions Sent to Pharmacy Refills  
 simvastatin (ZOCOR) 80 mg tablet 5 Sig: TAKE 1 TABLET BY MOUTH EVERY NIGHT AT BEDTIME Class: Normal  
 Pharmacy: [facility] [place] [address], [address] [place] [address] #: [phone] We Performed the Following REFERRAL TO DERMATOLOGY [REF19 Custom] REFERRAL TO GASTROENTEROLOGY [FNS60 Custom] REFERRAL TO SLEEP STUDIES [REF99 Custom] Referral Information Referral ID Referred By Referred To  
  
 [id number] [facility] [address] [place] [address] [phone] [place] [address] Renetta Peterson Visits Status Start Date End Date 1 New Request 7/24/18 7/24/19 If your referral has a status of pending review or denied, additional information will be sent to support the outcome of this decision. Referral ID Referred By Referred To  
 [id number] [facility] [address] Phone: [phone] Fax: [phone] Visits Status Start Date End Date 1 New Request 7/24/18 7/24/19 If your referral has a status of pending review or denied, additional information will be sent to support the outcome of this decision. Referral ID Referred By Referred To  
 [id number]  W Nyasia Peterson MD  
   [address] [place] [address] Phone: [phone] Fax: [phone] Visits Status Start Date End Date 1 New Request 7/24/18 7/24/19 If your referral has a status of pending review or denied, additional information will be sent to support the outcome of this decision. Introducing [facility]! Dear Eloise Jennings: 
Thank you for requesting a [employer] account. Our records indicate that you already have an active [employer] account. You can access your account anytime at [email or web address] Did you know that you can access your hospital and ER discharge instructions at any time in [employer]? You can also review all of your test results from your hospital stay or ER visit. Additional Information If you have questions, please visit the Frequently Asked Questions section of the [employer] website at [email or web address] Remember, [employer] is NOT to be used for urgent needs. For medical emergencies, dial 911. Now available from your iPhone and Android! Please provide this summary of care documentation to your next provider. Your primary care clinician is listed as Sana Mack. If you have any questions after today's visit, please call [phone].

## 2018-07-25 LAB — 1,25(OH)2D3 SERPL-MCNC: 40 PG/ML (ref 19.9–79.3)

## 2018-08-02 DIAGNOSIS — E78.00 ELEVATED CHOLESTEROL: ICD-10-CM

## 2018-08-02 DIAGNOSIS — R73.03 PREDIABETES: Primary | ICD-10-CM

## 2019-02-14 ENCOUNTER — OFFICE VISIT (OUTPATIENT)
Dept: INTERNAL MEDICINE CLINIC | Age: 54
End: 2019-02-14

## 2019-02-14 VITALS
SYSTOLIC BLOOD PRESSURE: 123 MMHG | RESPIRATION RATE: 16 BRPM | WEIGHT: 219.6 LBS | HEART RATE: 59 BPM | HEIGHT: 65 IN | BODY MASS INDEX: 36.59 KG/M2 | OXYGEN SATURATION: 97 % | DIASTOLIC BLOOD PRESSURE: 61 MMHG | TEMPERATURE: 97 F

## 2019-02-14 DIAGNOSIS — E55.9 VITAMIN D DEFICIENCY: ICD-10-CM

## 2019-02-14 DIAGNOSIS — E66.01 CLASS 2 SEVERE OBESITY WITH SERIOUS COMORBIDITY AND BODY MASS INDEX (BMI) OF 36.0 TO 36.9 IN ADULT, UNSPECIFIED OBESITY TYPE (HCC): ICD-10-CM

## 2019-02-14 DIAGNOSIS — E78.1 HYPERTRIGLYCERIDEMIA: ICD-10-CM

## 2019-02-14 DIAGNOSIS — E78.00 HYPERCHOLESTEROLEMIA: ICD-10-CM

## 2019-02-14 DIAGNOSIS — E34.9 HYPOTESTOSTERONISM: ICD-10-CM

## 2019-02-14 DIAGNOSIS — J40 BRONCHITIS: Primary | ICD-10-CM

## 2019-02-14 RX ORDER — PREDNISONE 5 MG/1
TABLET ORAL
Qty: 21 TAB | Refills: 0 | Status: SHIPPED | OUTPATIENT
Start: 2019-02-14 | End: 2019-04-22 | Stop reason: ALTCHOICE

## 2019-02-14 RX ORDER — ALBUTEROL SULFATE 90 UG/1
1 AEROSOL, METERED RESPIRATORY (INHALATION)
Qty: 1 INHALER | Refills: 1 | Status: SHIPPED | OUTPATIENT
Start: 2019-02-14 | End: 2022-02-02

## 2019-02-14 NOTE — PROGRESS NOTES
Health Maintenance Due   Topic Date Due    Shingrix Vaccine Age 49> (1 of 2) 12/08/2017    Influenza Age 5 to Adult  08/01/2018       Chief Complaint   Patient presents with    Cough       1. Have you been to the ER, urgent care clinic since your last visit? Hospitalized since your last visit? No    2. Have you seen or consulted any other health care providers outside of the [address] [place] [address] since your last visit? Include any pap smears or colon screening. No    3) Do you have an Advance Directive on file? no    4) Are you interested in receiving information on Advance Directives? NO      Patient is accompanied by self I have received verbal consent from Meena Paula to discuss any/all medical information while they are present in the room.

## 2019-02-15 NOTE — PATIENT INSTRUCTIONS
Bronchitis: Care Instructions  Your Care Instructions    Bronchitis is inflammation of the bronchial tubes, which carry air to the lungs. The tubes swell and produce mucus, or phlegm. The mucus and inflamed bronchial tubes make you cough. You may have trouble breathing. Most cases of bronchitis are caused by viruses like those that cause colds. Antibiotics usually do not help and they may be harmful. Bronchitis usually develops rapidly and lasts about 2 to 3 weeks in otherwise healthy people. Follow-up care is a key part of your treatment and safety. Be sure to make and go to all appointments, and call your doctor if you are having problems. It's also a good idea to know your test results and keep a list of the medicines you take. How can you care for yourself at home? · Take all medicines exactly as prescribed. Call your doctor if you think you are having a problem with your medicine. · Get some extra rest.  · Take an over-the-counter pain medicine, such as acetaminophen (Tylenol), ibuprofen (Advil, Motrin), or naproxen (Aleve) to reduce fever and relieve body aches. Read and follow all instructions on the label. · Do not take two or more pain medicines at the same time unless the doctor told you to. Many pain medicines have acetaminophen, which is Tylenol. Too much acetaminophen (Tylenol) can be harmful. · Take an over-the-counter cough medicine that contains dextromethorphan to help quiet a dry, hacking cough so that you can sleep. Avoid cough medicines that have more than one active ingredient. Read and follow all instructions on the label. · Breathe moist air from a humidifier, hot shower, or sink filled with hot water. The heat and moisture will thin mucus so you can cough it out. · Do not smoke. Smoking can make bronchitis worse. If you need help quitting, talk to your doctor about stop-smoking programs and medicines. These can increase your chances of quitting for good.   When should you call for help? Call 911 anytime you think you may need emergency care. For example, call if:    · You have severe trouble breathing.    Call your doctor now or seek immediate medical care if:    · You have new or worse trouble breathing.     · You cough up dark brown or bloody mucus (sputum).     · You have a new or higher fever.     · You have a new rash.    Watch closely for changes in your health, and be sure to contact your doctor if:    · You cough more deeply or more often, especially if you notice more mucus or a change in the color of your mucus.     · You are not getting better as expected. Where can you learn more? Go to http://honey-fatou.info/. Enter H333 in the search box to learn more about \"Bronchitis: Care Instructions. \"  Current as of: September 5, 2018  Content Version: 11.9  © 9017-3457 [employer]. Care instructions adapted under license by [employer] (which disclaims liability or warranty for this information). If you have questions about a medical condition or this instruction, always ask your healthcare professional. [address] any warranty or liability for your use of this information.

## 2019-02-15 NOTE — PROGRESS NOTES
Subjective:     Chief Complaint   Patient presents with    Cough     dry cough and chest congestion, chest pressure; h/o pneumonia and bronchitis       History of Present Illness    Dre Barrera is a 48y.o. year old male who is a patient of Dr. Polo Herbert that presents today with complaints of cough and congestion. He is concerned because he has a hx of pneumonia which required a hospitalization a few years ago. He denies any fever. He has been taking mucinex with minimal relief. Cough is non productive. Denies wheezing. NAD. No other new complaints at this time. No CP, SOB, GI, or  symptoms. Reviewed medications, recent lab work and imaging with patient. Pt reports compliance with medications. Needs labs. Current Outpatient Medications on File Prior to Visit   Medication Sig Dispense Refill    simvastatin (ZOCOR) 80 mg tablet TAKE 1 TABLET BY MOUTH EVERY NIGHT AT BEDTIME 30 Tab 5    Omega-3 Fatty Acids (FISH OIL) 300 mg cap Take  by mouth.  multivitamin (ONE A DAY) tablet Take 1 Tab by mouth daily.  cholecalciferol, vitamin D3, (VITAMIN D3) 2,000 unit tab Take  by mouth. 3 x weekly       No current facility-administered medications on file prior to visit. No Known Allergies   Past Medical History:   Diagnosis Date    Elevated PSA     Hypercholesterolemia     Seasonal allergies       Past Surgical History:   Procedure Laterality Date    ENDOSCOPY, COLON, DIAGNOSTIC      10, due 15 v 20      Social History     Tobacco Use    Smoking status: Former Smoker     Last attempt to quit: 7/24/2003     Years since quitting: 15.5    Smokeless tobacco: Never Used   Substance Use Topics    Alcohol use:  Yes     Alcohol/week: 1.5 oz     Types: 3 Glasses of wine per week     Comment: 3 per week    Drug use: No      Family History   Problem Relation Age of Onset    Cancer Father         pancreatic    Cancer Paternal Grandmother         lung        Objective:     Vitals:    02/14/19 1028   BP: 123/61   Pulse: (!) 59   Resp: 16   Temp: 97 °F (36.1 °C)   TempSrc: Oral   SpO2: 97%   Weight: 219 lb 9.6 oz (99.6 kg)   Height: 5' 5\" (1.651 m)       Review of Systems   Constitutional: Negative. HENT: Positive for congestion. Eyes: Negative. Respiratory: Positive for cough. Cardiovascular: Negative. Gastrointestinal: Negative. Genitourinary: Negative. Musculoskeletal: Negative. Skin: Negative. Neurological: Negative. Psychiatric/Behavioral: Negative. Physical Exam   Constitutional: He is oriented to person, place, and time. He appears well-developed and well-nourished. No distress. Pleasant  male. NAD   HENT:   Head: Normocephalic and atraumatic. Eyes: Conjunctivae and EOM are normal. Pupils are equal, round, and reactive to light. Neck: Normal range of motion. Neck supple. Cardiovascular: Normal rate, regular rhythm and normal heart sounds. Pulmonary/Chest: Effort normal. No respiratory distress. He has decreased breath sounds (Slightly decreased on the left side ). He has wheezes (scattered scant wheezing). Abdominal: He exhibits no distension. Musculoskeletal: Normal range of motion. He exhibits no edema, tenderness or deformity. Neurological: He is alert and oriented to person, place, and time. Skin: Skin is warm and dry. No rash noted. He is not diaphoretic. No erythema. No pallor. Psychiatric: He has a normal mood and affect. His behavior is normal.   Nursing note and vitals reviewed. Assessment/ Plan:   Diagnoses and all orders for this visit:    1. Bronchitis   Will order  -     predniSONE (STERAPRED) 5 mg dose pack; See administration instruction per 5mg dose pack  -     albuterol (PROVENTIL HFA, VENTOLIN HFA, PROAIR HFA) 90 mcg/actuation inhaler; Take 1 Puff by inhalation every four (4) hours as needed for Wheezing.     2. Class 2 severe obesity with serious comorbidity and body mass index (BMI) of 36.0 to 36.9 in adult, unspecified obesity type ([address]   Addressed weight, diet and exercise with patient. Decrease carbohydrates (white foods, sweet foods, sweet drinks and alcohol), increase green leafy vegetables and protein (lean meats and beans) with each meal. Avoid fried foods. Eat 3-5 small meals daily. Do not skip meals. Increase water intake. Increase physical activity to 30 minutes daily for health benefit or 60 minutes daily to prevent weight regain, as tolerated. Get 7-8 hours uninterrupted sleep nightly. 3. Vitamin D deficiency   Will order  -     VITAMIN D, 25 HYDROXY    4. Hypertriglyceridemia   Will order  -     CBC W/O DIFF  -     METABOLIC PANEL, COMPREHENSIVE  -     LIPID PANEL  -     HEMOGLOBIN A1C WITH EAG  -     TESTOSTERONE, FREE & TOTAL  -     TSH 3RD GENERATION    Patient's plan of care has been reviewed with them. Patient and/or family have verbally conveyed their understanding and agreement of the patient's signs, symptoms, diagnosis, treatment and prognosis and additionally agree to follow up as recommended or return to [facility] should their condition change prior to follow-up. Discharge instructions have also been provided to the patient with some educational information regarding their diagnosis as well a list of reasons why they would want to return to the office prior to their follow-up appointment should their condition change. Follow-up with Dr. Polo Herbert in 6 months.

## 2019-02-17 ENCOUNTER — OFFICE VISIT (OUTPATIENT)
Dept: URGENT CARE | Age: 54
End: 2019-02-17

## 2019-02-17 VITALS
DIASTOLIC BLOOD PRESSURE: 78 MMHG | WEIGHT: 220 LBS | HEIGHT: 65 IN | SYSTOLIC BLOOD PRESSURE: 125 MMHG | TEMPERATURE: 97.9 F | RESPIRATION RATE: 19 BRPM | OXYGEN SATURATION: 97 % | BODY MASS INDEX: 36.65 KG/M2 | HEART RATE: 78 BPM

## 2019-02-17 DIAGNOSIS — R52 PAINFUL COUGH: Primary | ICD-10-CM

## 2019-02-17 DIAGNOSIS — Z87.01 HISTORY OF PNEUMONIA: ICD-10-CM

## 2019-02-17 DIAGNOSIS — R05.8 PAINFUL COUGH: Primary | ICD-10-CM

## 2019-02-17 RX ORDER — GUAIFENESIN 600 MG/1
600 TABLET, EXTENDED RELEASE ORAL 2 TIMES DAILY
Qty: 14 TAB | Refills: 0 | Status: SHIPPED | OUTPATIENT
Start: 2019-02-17 | End: 2019-02-24

## 2019-02-17 RX ORDER — DOXYCYCLINE 100 MG/1
100 CAPSULE ORAL 2 TIMES DAILY
Qty: 14 CAP | Refills: 0 | Status: SHIPPED | OUTPATIENT
Start: 2019-02-17 | End: 2019-02-24

## 2019-02-17 RX ORDER — BENZONATATE 200 MG/1
200 CAPSULE ORAL
Qty: 14 CAP | Refills: 0 | Status: SHIPPED | OUTPATIENT
Start: 2019-02-17 | End: 2019-02-24

## 2019-02-17 NOTE — PROGRESS NOTES
Here for cough  Seen 3 days ago put on prednisone and albuterol for bronchitis  This has not helped his symptoms  Cough is tight with bronchial pain with coughing and breathing  Very concerned today as he has hx of pneumonia that \"developed quickly\" in past. Says had normal CXR, got worse within 24 hours and had to go to hospital where they found it on CT scan. He does promote lower than usual energy. Overall worse. Has a past medical history of Elevated PSA, Hypercholesterolemia, and Seasonal allergies, gout, pneumonia  Former smoker. Past Medical History:   Diagnosis Date    Elevated PSA     Hypercholesterolemia     Seasonal allergies         Past Surgical History:   Procedure Laterality Date    ENDOSCOPY, COLON, DIAGNOSTIC      10, due 13 v 21         Family History   Problem Relation Age of Onset    Cancer Father         pancreatic    Cancer Paternal Grandmother         lung        Social History     Socioeconomic History    Marital status:      Spouse name: Not on file    Number of children: Not on file    Years of education: Not on file    Highest education level: Not on file   Social Needs    Financial resource strain: Not on file    Food insecurity - worry: Not on file    Food insecurity - inability: Not on file   [employer] needs - medical: Not on file   [employer] needs - non-medical: Not on file   Occupational History    Not on file   Tobacco Use    Smoking status: Former Smoker     Last attempt to quit: 7/24/2003     Years since quitting: 15.5    Smokeless tobacco: Never Used   Substance and Sexual Activity    Alcohol use:  Yes     Alcohol/week: 1.5 oz     Types: 3 Glasses of wine per week     Comment: 3 per week    Drug use: No    Sexual activity: Yes     Partners: Female     Comment:  has one child   Other Topics Concern    Not on file   Social History Narrative    Not on file                ALLERGIES: Patient has no known allergies. Review of Systems   Constitutional: Positive for fatigue. Gastrointestinal: Negative for nausea and vomiting. Neurological: Negative for dizziness. All other systems reviewed and are negative. Vitals:    02/17/19 1416 02/17/19 1422   BP:  125/78   Pulse:  78   Resp:  19   Temp:  97.9 °F (36.6 °C)   SpO2:  97%   Weight: 219 lb (99.3 kg) 220 lb (99.8 kg)   Height: 5' 5\" (1.651 m) 5' 5\" (1.651 m)       Physical Exam   Constitutional: He is oriented to person, place, and time. No distress. Non-toxic appearing, well hydrated   HENT:   Head: Normocephalic and atraumatic. Nose: Nose normal.   Mouth/Throat: Oropharynx is clear and moist. No oropharyngeal exudate. TMs normal.   Eyes: EOM are normal. Pupils are equal, round, and reactive to light. No scleral icterus. Neck: Normal range of motion. Neck supple. Cardiovascular: Normal rate, regular rhythm and normal heart sounds. Exam reveals no gallop and no friction rub. No murmur heard. Pulmonary/Chest: Effort normal and breath sounds normal. No respiratory distress. He has no wheezes. He has no rales. Lymphadenopathy:     He has no cervical adenopathy. Neurological: He is alert and oriented to person, place, and time. Skin: Skin is warm and dry. No rash noted. He is not diaphoretic. No erythema. No pallor. Psychiatric: He has a normal mood and affect. His behavior is normal. Thought content normal.   Nursing note and vitals reviewed. MDM     Differential Diagnosis; Clinical Impression; Plan:       CLINICAL IMPRESSION:  (R05) Painful cough  (primary encounter diagnosis)  (Z87.01) History of pneumonia    Orders Placed This Encounter      doxycycline (VIBRAMYCIN) 100 mg capsule          Sig: Take 1 Cap by mouth two (2) times a day for 7 days. Dispense:  14 Cap          Refill:  0      guaiFENesin ER (MUCINEX) 600 mg ER tablet          Sig: Take 1 Tab by mouth two (2) times a day for 7 days.           Dispense:  14 Tab Refill:  0      benzonatate (TESSALON) 200 mg capsule          Sig: Take 1 Cap by mouth three (3) times daily as needed for Cough for up to 7 days. Dispense:  14 Cap          Refill:  0      Plan:  CXR without acute process. Stable VS. No labored breathing. See above orders  May stop prednisone  May continue albuterol  Fluids, rest  ED for any new, worsening or changes       We have reviewed concerning signs/symptoms, normal vs abnormal progression of medical condition and when to seek immediate medical attention. Schedule with PCP or Urgent Care immediately for worsening or new symptoms. See your PCP if there is not at least some improvement in symptoms within the next 5-7 days. You should see your PCP for updates on your routine health maintenance. XR Results (most recent):  Results from Appointment encounter on 02/17/19   XR CHEST PA LAT    Narrative CLINICAL HISTORY: Cough with history of pneumonia   INDICATION: Cough    COMPARISON: 12/25/2015    FINDINGS:   PA and lateral views of the chest are obtained. The cardiopericardial silhouette is within normal limits. There is no pleural  effusion, pneumothorax or focal consolidation present. Impression IMPRESSION: No acute intrathoracic disease.            Procedures

## 2019-02-17 NOTE — PATIENT INSTRUCTIONS
Follow up with PCP without improvement over next 5-7 days sooner/immediately for new or worsening    Cough: Care Instructions  Your Care Instructions    A cough is your body's response to something that bothers your throat or airways. Many things can cause a cough. You might cough because of a cold or the flu, bronchitis, or asthma. Smoking, postnasal drip, allergies, and stomach acid that backs up into your throat also can cause coughs. A cough is a symptom, not a disease. Most coughs stop when the cause, such as a cold, goes away. You can take a few steps at home to cough less and feel better. Follow-up care is a key part of your treatment and safety. Be sure to make and go to all appointments, and call your doctor if you are having problems. It's also a good idea to know your test results and keep a list of the medicines you take. How can you care for yourself at home? · Drink lots of water and other fluids. This helps thin the mucus and soothes a dry or sore throat. Honey or lemon juice in hot water or tea may ease a dry cough. · Take cough medicine as directed by your doctor. · Prop up your head on pillows to help you breathe and ease a dry cough. · Try cough drops to soothe a dry or sore throat. Cough drops don't stop a cough. Medicine-flavored cough drops are no better than candy-flavored drops or hard candy. · Do not smoke. Avoid secondhand smoke. If you need help quitting, talk to your doctor about stop-smoking programs and medicines. These can increase your chances of quitting for good. When should you call for help? Call 911 anytime you think you may need emergency care.  For example, call if:    · You have severe trouble breathing.    Call your doctor now or seek immediate medical care if:    · You cough up blood.     · You have new or worse trouble breathing.     · You have a new or higher fever.     · You have a new rash.    Watch closely for changes in your health, and be sure to contact your doctor if:    · You cough more deeply or more often, especially if you notice more mucus or a change in the color of your mucus.     · You have new symptoms, such as a sore throat, an earache, or sinus pain.     · You do not get better as expected. Where can you learn more? Go to http://honey-fatou.info/. Enter D279 in the search box to learn more about \"Cough: Care Instructions. \"  Current as of: September 5, 2018  Content Version: 11.9  © 6159-4588 [employer]. Care instructions adapted under license by [employer] (which disclaims liability or warranty for this information). If you have questions about a medical condition or this instruction, always ask your healthcare professional. [address] any warranty or liability for your use of this information.

## 2019-02-17 NOTE — LETTER
NOTIFICATION RETURN TO WORK / SCHOOL 
 
2/17/2019 3:35 PM 
 
Mr. Gabrielle Santana C/Yung Roberts To Whom It May Concern: 
 
Gabrielle Santana is currently under the care of "[employer] [facility]. He will return to work/school on: 02/19 OR 02/20/2019 If there are questions or concerns please have the patient contact our office. Sincerely, E PROVIDER

## 2019-04-16 ENCOUNTER — OFFICE VISIT (OUTPATIENT)
Dept: URGENT CARE | Age: 54
End: 2019-04-16

## 2019-04-16 VITALS
RESPIRATION RATE: 18 BRPM | OXYGEN SATURATION: 97 % | WEIGHT: 216 LBS | BODY MASS INDEX: 35.99 KG/M2 | HEART RATE: 80 BPM | TEMPERATURE: 97.2 F | DIASTOLIC BLOOD PRESSURE: 56 MMHG | HEIGHT: 65 IN | SYSTOLIC BLOOD PRESSURE: 121 MMHG

## 2019-04-16 DIAGNOSIS — J40 BRONCHITIS: Primary | ICD-10-CM

## 2019-04-16 RX ORDER — BENZONATATE 200 MG/1
200 CAPSULE ORAL
Qty: 30 CAP | Refills: 0 | Status: SHIPPED | OUTPATIENT
Start: 2019-04-16 | End: 2020-02-07 | Stop reason: ALTCHOICE

## 2019-04-16 RX ORDER — DOXYCYCLINE 100 MG/1
100 CAPSULE ORAL 2 TIMES DAILY
Qty: 20 CAP | Refills: 0 | Status: SHIPPED | OUTPATIENT
Start: 2019-04-16 | End: 2019-04-26

## 2019-04-16 NOTE — PATIENT INSTRUCTIONS
Bronchitis: Care Instructions  Your Care Instructions    Bronchitis is inflammation of the bronchial tubes, which carry air to the lungs. The tubes swell and produce mucus, or phlegm. The mucus and inflamed bronchial tubes make you cough. You may have trouble breathing. Most cases of bronchitis are caused by viruses like those that cause colds. Antibiotics usually do not help and they may be harmful. Bronchitis usually develops rapidly and lasts about 2 to 3 weeks in otherwise healthy people. Follow-up care is a key part of your treatment and safety. Be sure to make and go to all appointments, and call your doctor if you are having problems. It's also a good idea to know your test results and keep a list of the medicines you take. How can you care for yourself at home? · Take all medicines exactly as prescribed. Call your doctor if you think you are having a problem with your medicine. · Get some extra rest.  · Take an over-the-counter pain medicine, such as acetaminophen (Tylenol), ibuprofen (Advil, Motrin), or naproxen (Aleve) to reduce fever and relieve body aches. Read and follow all instructions on the label. · Do not take two or more pain medicines at the same time unless the doctor told you to. Many pain medicines have acetaminophen, which is Tylenol. Too much acetaminophen (Tylenol) can be harmful. · Take an over-the-counter cough medicine that contains dextromethorphan to help quiet a dry, hacking cough so that you can sleep. Avoid cough medicines that have more than one active ingredient. Read and follow all instructions on the label. · Breathe moist air from a humidifier, hot shower, or sink filled with hot water. The heat and moisture will thin mucus so you can cough it out. · Do not smoke. Smoking can make bronchitis worse. If you need help quitting, talk to your doctor about stop-smoking programs and medicines. These can increase your chances of quitting for good.   When should you call for help? Call 911 anytime you think you may need emergency care. For example, call if:    · You have severe trouble breathing.    Call your doctor now or seek immediate medical care if:    · You have new or worse trouble breathing.     · You cough up dark brown or bloody mucus (sputum).     · You have a new or higher fever.     · You have a new rash.    Watch closely for changes in your health, and be sure to contact your doctor if:    · You cough more deeply or more often, especially if you notice more mucus or a change in the color of your mucus.     · You are not getting better as expected. Where can you learn more? Go to http://honey-fatou.info/. Enter H333 in the search box to learn more about \"Bronchitis: Care Instructions. \"  Current as of: September 5, 2018  Content Version: 11.9  © 4879-0406 [employer]. Care instructions adapted under license by [employer] (which disclaims liability or warranty for this information). If you have questions about a medical condition or this instruction, always ask your healthcare professional. [address] any warranty or liability for your use of this information.

## 2019-04-16 NOTE — PROGRESS NOTES
Cold Symptoms   The history is provided by the patient. This is a new problem. The current episode started 2 days ago. The problem occurs constantly. The problem has been gradually worsening. The cough is non-productive. There has been no fever. Associated symptoms include rhinorrhea, myalgias and shortness of breath. Pertinent negatives include no chest pain, no chills, no sweats, no ear congestion, no ear pain, no headaches, no sore throat and no wheezing. He has tried decongestants for the symptoms. The treatment provided no relief. Smoker: Former. Past Medical History:   Diagnosis Date    Elevated PSA     Hypercholesterolemia     Seasonal allergies         Past Surgical History:   Procedure Laterality Date    ENDOSCOPY, COLON, DIAGNOSTIC      10, due 13 v 21         Family History   Problem Relation Age of Onset    Cancer Father         pancreatic    Cancer Paternal Grandmother         lung        Social History     Socioeconomic History    Marital status:      Spouse name: Not on file    Number of children: Not on file    Years of education: Not on file    Highest education level: Not on file   Occupational History    Not on file   Social Needs    Financial resource strain: Not on file    Food insecurity:     Worry: Not on file     Inability: Not on file    Transportation needs:     Medical: Not on file     Non-medical: Not on file   Tobacco Use    Smoking status: Former Smoker     Last attempt to quit: 7/24/2003     Years since quitting: 15.7    Smokeless tobacco: Never Used   Substance and Sexual Activity    Alcohol use:  Yes     Alcohol/week: 1.5 oz     Types: 3 Glasses of wine per week     Comment: 3 per week    Drug use: No    Sexual activity: Yes     Partners: Female     Comment:  has one child   Lifestyle    Physical activity:     Days per week: Not on file     Minutes per session: Not on file    Stress: Not on file   Relationships    Social connections: Talks on phone: Not on file     Gets together: Not on file     Attends [employer] service: Not on file     Active member of club or organization: Not on file     Attends meetings of clubs or organizations: Not on file     Relationship status: Not on file    Intimate partner violence:     Fear of current or ex partner: Not on file     Emotionally abused: Not on file     Physically abused: Not on file     Forced sexual activity: Not on file   Other Topics Concern    Not on file   Social History Narrative    Not on file                ALLERGIES: Patient has no known allergies. Review of Systems   Constitutional: Negative for chills and fever. HENT: Positive for congestion and rhinorrhea. Negative for ear pain and sore throat. Respiratory: Positive for cough and shortness of breath. Negative for wheezing. Cardiovascular: Negative for chest pain and palpitations. Musculoskeletal: Positive for myalgias. Skin: Negative for rash. Neurological: Negative for headaches. Hematological: Negative for adenopathy. Vitals:    04/16/19 1917   BP: 121/56   Pulse: 80   Resp: 18   Temp: 97.2 °F (36.2 °C)   SpO2: 97%   Weight: 216 lb (98 kg)   Height: 5' 5\" (1.651 m)       Physical Exam   Constitutional: He appears well-developed and well-nourished. No distress. HENT:   Right Ear: External ear and ear canal normal. Tympanic membrane is erythematous. Left Ear: Tympanic membrane, external ear and ear canal normal.   Nose: Rhinorrhea present. Right sinus exhibits no maxillary sinus tenderness and no frontal sinus tenderness. Left sinus exhibits no maxillary sinus tenderness and no frontal sinus tenderness. Mouth/Throat: Oropharynx is clear and moist and mucous membranes are normal. No oropharyngeal exudate, posterior oropharyngeal edema, posterior oropharyngeal erythema or tonsillar abscesses. Cardiovascular: Normal rate, regular rhythm and normal heart sounds.    Pulmonary/Chest: Effort normal and breath sounds normal. No respiratory distress. He has no wheezes. He has no rales. Lymphadenopathy:     He has no cervical adenopathy. Neurological: He is alert. Skin: He is not diaphoretic. Psychiatric: He has a normal mood and affect. His behavior is normal. Judgment and thought content normal.   Nursing note and vitals reviewed. [facility] [place]    ICD-10-CM ICD-9-CM    1. Bronchitis J40 490      Medications Ordered Today   Medications    doxycycline (VIBRAMYCIN) 100 mg capsule     Sig: Take 1 Cap by mouth two (2) times a day for 10 days. Dispense:  20 Cap     Refill:  0    benzonatate (TESSALON) 200 mg capsule     Sig: Take 1 Cap by mouth three (3) times daily as needed for Cough. Dispense:  30 Cap     Refill:  0     The patients condition was discussed with the patient and they understand. The patient is to follow up with PCP [facility]. If signs and symptoms become worse the pt is to go to the ER. The patient is to take medications as prescribed.              Procedures

## 2019-04-22 ENCOUNTER — OFFICE VISIT (OUTPATIENT)
Dept: INTERNAL MEDICINE CLINIC | Age: 54
End: 2019-04-22

## 2019-04-22 VITALS
HEIGHT: 65 IN | WEIGHT: 213.8 LBS | RESPIRATION RATE: 18 BRPM | HEART RATE: 64 BPM | SYSTOLIC BLOOD PRESSURE: 105 MMHG | OXYGEN SATURATION: 97 % | BODY MASS INDEX: 35.62 KG/M2 | DIASTOLIC BLOOD PRESSURE: 82 MMHG

## 2019-04-22 DIAGNOSIS — J06.9 URI, ACUTE: Primary | ICD-10-CM

## 2019-04-22 DIAGNOSIS — E66.9 NON MORBID OBESITY, UNSPECIFIED OBESITY TYPE: ICD-10-CM

## 2019-04-22 DIAGNOSIS — F98.8 ATTENTION DEFICIT DISORDER (ADD) IN ADULT: ICD-10-CM

## 2019-04-22 DIAGNOSIS — E78.00 HYPERCHOLESTEROLEMIA: ICD-10-CM

## 2019-04-22 DIAGNOSIS — F41.9 ANXIETY: ICD-10-CM

## 2019-04-22 RX ORDER — FLUTICASONE PROPIONATE 50 MCG
2 SPRAY, SUSPENSION (ML) NASAL DAILY
Qty: 1 BOTTLE | Refills: 5 | Status: SHIPPED | OUTPATIENT
Start: 2019-04-22 | End: 2022-02-02

## 2019-04-22 NOTE — PROGRESS NOTES
Ketty Neal is a 48 y.o. male    Chief Complaint   Patient presents with    Cholesterol Problem    Cough     1. Have you been to the ER, urgent care clinic since your last visit? Hospitalized since your last visit? No     2. Have you seen or consulted any other health care providers outside of the [address] [place] [address] since your last visit? Include any pap smears or colon screening.   No       Visit Vitals  /82   Pulse 64   Resp 18   Ht 5' 5\" (1.651 m)   Wt 213 lb 12.8 oz (97 kg)   SpO2 97%   BMI 35.58 kg/m²

## 2019-05-01 NOTE — PROGRESS NOTES
HISTORY OF PRESENT ILLNESS  Dinorah Busby is a 48 y.o. male. Pt. comes in for f/u. Has multiple medical problems. Reports a few days of a dry cough. Having allergy symptoms. Is very obese. History of ADD and anxiety. Not take any medications for it at this point. Reports compliance with medications and diet. Med list and most recent labs/studies reviewed with pt. Trying to be active physically to control weight. Reports no other new c/o. HPI    Review of Systems   Constitutional: Negative. HENT: Positive for congestion. Negative for sore throat. Eyes: Negative for blurred vision. Respiratory: Positive for cough. Negative for sputum production, shortness of breath and wheezing. Cardiovascular: Negative for chest pain and leg swelling. Gastrointestinal: Negative for abdominal pain. Genitourinary: Negative for dysuria. Musculoskeletal: Positive for joint pain. Negative for falls. Skin: Negative. Neurological: Negative for dizziness, sensory change and headaches. Endo/Heme/Allergies: Negative. Negative for polydipsia. Psychiatric/Behavioral: Negative for depression. The patient is nervous/anxious. The patient does not have insomnia. All other systems reviewed and are negative. Physical Exam   Constitutional: He is oriented to person, place, and time. He appears well-developed and well-nourished. No distress. Pleasant  Obese   HENT:   Head: Normocephalic and atraumatic. Nose: Nose normal.   Mouth/Throat: Oropharynx is clear and moist. No oropharyngeal exudate. PND   Eyes: Conjunctivae are normal.   Neck: Normal range of motion. Neck supple. No JVD present. No thyromegaly present. Cardiovascular: Normal rate, regular rhythm, normal heart sounds and intact distal pulses. No murmur heard. Pulmonary/Chest: Effort normal and breath sounds normal. No respiratory distress. He has no wheezes. He has no rales. Abdominal: Soft.  Bowel sounds are normal. He exhibits no distension. obese   Musculoskeletal: Normal range of motion. He exhibits no edema or tenderness. Neurological: He is alert and oriented to person, place, and time. Coordination normal.   Skin: Skin is warm and dry. No rash noted. Psychiatric: He has a normal mood and affect. His behavior is normal.   Nursing note and vitals reviewed. ASSESSMENT and PLAN  Diagnoses and all orders for this visit:    1. URI, acute    2. Attention deficit disorder (ADD) in adult    3. Hypercholesterolemia    4. Non morbid obesity, unspecified obesity type    5. Anxiety    Other orders  -     fluticasone propionate (FLONASE) 50 mcg/actuation nasal spray; 2 Sprays by Both Nostrils route daily. Follow-up and Dispositions    · Return in about 6 months (around 10/22/2019).      lab results and schedule of future lab studies reviewed with patient  reviewed diet, exercise and weight control  reviewed medications and side effects in detail  F/u with other MD's as scheduled  Patient asked that there is no need for antibiotics  Can try OTC cough medications as needed

## 2019-05-06 ENCOUNTER — HOSPITAL ENCOUNTER (OUTPATIENT)
Dept: ULTRASOUND IMAGING | Age: 54
Discharge: HOME | End: 2019-05-06
Attending: INTERNAL MEDICINE
Payer: COMMERCIAL

## 2019-05-06 ENCOUNTER — OFFICE VISIT (OUTPATIENT)
Dept: INTERNAL MEDICINE CLINIC | Age: 54
End: 2019-05-06

## 2019-05-06 VITALS
HEIGHT: 65 IN | SYSTOLIC BLOOD PRESSURE: 117 MMHG | OXYGEN SATURATION: 97 % | TEMPERATURE: 97 F | BODY MASS INDEX: 35.65 KG/M2 | RESPIRATION RATE: 20 BRPM | DIASTOLIC BLOOD PRESSURE: 82 MMHG | WEIGHT: 214 LBS | HEART RATE: 87 BPM

## 2019-05-06 DIAGNOSIS — K42.9 UMBILICAL HERNIA WITHOUT OBSTRUCTION AND WITHOUT GANGRENE: Primary | ICD-10-CM

## 2019-05-06 DIAGNOSIS — E78.00 HYPERCHOLESTEROLEMIA: ICD-10-CM

## 2019-05-06 DIAGNOSIS — E66.9 NON MORBID OBESITY, UNSPECIFIED OBESITY TYPE: ICD-10-CM

## 2019-05-06 DIAGNOSIS — R19.09 UMBILICAL MASS: ICD-10-CM

## 2019-05-06 DIAGNOSIS — K42.9 UMBILICAL HERNIA WITHOUT OBSTRUCTION AND WITHOUT GANGRENE: ICD-10-CM

## 2019-05-06 PROCEDURE — 76705 ECHO EXAM OF ABDOMEN: CPT

## 2019-05-06 RX ORDER — LAMOTRIGINE 25 MG/1
TABLET ORAL
COMMUNITY
Start: 2019-05-05

## 2019-05-06 NOTE — PROGRESS NOTES
Health Maintenance Due   Topic Date Due    Shingrix Vaccine Age 49> (1 of 2) 12/08/2017       Chief Complaint   Patient presents with    Abdominal Pain     enlarged umbulicus started thursday evening after yardwork, [facility] infection       1. Have you been to the ER, urgent care clinic since your last visit? Hospitalized since your last visit? Yes When: 5/4/19 [facility] Reason for visit: abd pain    2. Have you seen or consulted any other health care providers outside of the [address] [place] [address] since your last visit? Include any pap smears or colon screening. No    3) Do you have an Advance Directive on file? no    4) Are you interested in receiving information on Advance Directives? NO      Patient is accompanied by self I have received verbal consent from Lulu Contreras to discuss any/all medical information while they are present in the room.

## 2019-05-06 NOTE — PROGRESS NOTES
HISTORY OF PRESENT ILLNESS  Ketty Neal is a 48 y.o. male. Patient comes in for follow-up. Reports noticing a mass in the umbilical area for the past few days. Denies any obvious trauma except for some yard work. Tells me he went to urgent care and given antibiotics for possible infection. Tells me blood work was negative. Denies any related symptoms including pain, nausea, vomiting, BM changes, fevers, etc.  BP is stable. Followed by psychiatrist for ADD. Med list and most recent studies reviewed with patient. He is obese. Denies any other significant new problems. HPI    Review of Systems   Constitutional: Negative. Negative for fever. HENT: Negative. Eyes: Negative. Respiratory: Negative for shortness of breath. Cardiovascular: Negative for chest pain and leg swelling. Gastrointestinal: Negative. Negative for abdominal pain, blood in stool, constipation, diarrhea, melena, nausea and vomiting. Genitourinary: Negative. Negative for dysuria. Musculoskeletal: Negative. Skin: Negative for itching and rash. Neurological: Negative for dizziness and headaches. Endo/Heme/Allergies: Negative. Psychiatric/Behavioral: Negative for depression. All other systems reviewed and are negative. Physical Exam   Constitutional: He is oriented to person, place, and time. He appears well-developed and well-nourished. No distress. Pleasant  Obese   HENT:   Head: Normocephalic and atraumatic. Mouth/Throat: Oropharynx is clear and moist.   Eyes: Conjunctivae are normal.   Neck: Normal range of motion. Neck supple. No thyromegaly present. Cardiovascular: Normal rate, regular rhythm, normal heart sounds and intact distal pulses. No murmur heard. Pulmonary/Chest: Effort normal and breath sounds normal. No respiratory distress. He has no wheezes. He has no rales. Abdominal: Soft. Bowel sounds are normal. He exhibits no distension. There is no tenderness.    Obese  Some redness in umbilical area with a deep 2 x 2 inch mass, no tenderness, no discharge   Musculoskeletal: Normal range of motion. He exhibits no edema or tenderness. Neurological: He is alert and oriented to person, place, and time. Coordination normal.   Skin: Skin is warm and dry. No rash noted. Psychiatric: He has a normal mood and affect. His behavior is normal.   Nursing note and vitals reviewed. ASSESSMENT and PLAN  Diagnoses and all orders for this visit:    1. Umbilical hernia without obstruction and without gangrene  -     US ABD LTD; Future    2. Umbilical mass  -     US ABD LTD; Future    3. Hypercholesterolemia    4.  Non morbid obesity, unspecified obesity type      Follow-up and Dispositions    · Return if symptoms worsen or fail to improve.     lab results and schedule of future lab studies reviewed with patient  reviewed diet, exercise and weight control  reviewed medications and side effects in detail  Possible etiologies and treatment of his abdominal mass was discussed  Patient was set up for an ultrasound later today  Probably will need to be seen by surgery depending on what the ultrasound shows

## 2019-05-08 DIAGNOSIS — K42.9 UMBILICAL HERNIA WITHOUT OBSTRUCTION AND WITHOUT GANGRENE: Primary | ICD-10-CM

## 2019-05-08 NOTE — PROGRESS NOTES
The pt called the office requesting his US results. After verifying HIPAA, reviewed the US results and Dr. Yessica Lyons recommendations. Provided the pt the contact information to Dr. Aziza Rodríguez and advised the pt to call once the appt is made so that an insurance referral can be placed. The pt voiced thanks and understanding.

## 2019-05-09 ENCOUNTER — TELEPHONE (OUTPATIENT)
Dept: SURGERY | Age: 54
End: 2019-05-09

## 2019-05-09 NOTE — TELEPHONE ENCOUNTER
Pt tried to make a new patient appointment to see Dr. Aparna Anguiano. Informed patient our office would call him back tomorrow to schedule.

## 2019-05-14 ENCOUNTER — TELEPHONE (OUTPATIENT)
Dept: INTERNAL MEDICINE CLINIC | Age: 54
End: 2019-05-14

## 2019-05-14 NOTE — TELEPHONE ENCOUNTER
Spoke with office and pt had been dismissed from the practice so they are working on fixing the issue

## 2019-05-14 NOTE — TELEPHONE ENCOUNTER
Referral to a new general surgeon besides Dr. Nallely Swenson due to deborah issues. Referral Follow Up     Riki Johnson routed conversation to [facility] Int Nurse Pool 33 minutes ago (1:04 PM)      Riki Liriano 35 minutes ago (1:02 PM)      [facility]. Incoming call       Riki Johnson 35 minutes ago (1:02 PM)         Surgical specialists are having trouble registering pt in [place].  Wants to know if Dr. Elieser Germain refer pt elsewhere due to time concerns

## 2019-05-14 NOTE — TELEPHONE ENCOUNTER
Surgical specialists are having trouble registering pt in [place].  Wants to know if Dr. Angel Cowan refer pt elsewhere due to time concerns

## 2019-05-20 ENCOUNTER — TELEPHONE (OUTPATIENT)
Dept: INTERNAL MEDICINE CLINIC | Age: 54
End: 2019-05-20

## 2019-05-20 NOTE — TELEPHONE ENCOUNTER
Patient would like a referral to a surgeon outside of [facility] for his hernia  Please contact patient with info

## 2019-05-21 NOTE — TELEPHONE ENCOUNTER
Call placed to pt and left message for pt with contact information for surgeons Dr Mesha Muniz and Dr Gold Collins at [facility] per pt request

## 2019-05-22 ENCOUNTER — OFFICE VISIT (OUTPATIENT)
Dept: SURGERY | Age: 54
End: 2019-05-22

## 2019-05-22 VITALS
OXYGEN SATURATION: 95 % | TEMPERATURE: 98.2 F | HEIGHT: 65 IN | DIASTOLIC BLOOD PRESSURE: 78 MMHG | HEART RATE: 72 BPM | SYSTOLIC BLOOD PRESSURE: 121 MMHG | RESPIRATION RATE: 16 BRPM | WEIGHT: 211.5 LBS | BODY MASS INDEX: 35.24 KG/M2

## 2019-05-22 DIAGNOSIS — K42.9 UMBILICAL HERNIA WITHOUT OBSTRUCTION AND WITHOUT GANGRENE: Primary | ICD-10-CM

## 2019-05-22 NOTE — PROGRESS NOTES
HISTORY OF PRESENT ILLNESS  Patricio Dickey is a 48 y.o. male who is referred by Dr. Amina Caldera for further evaluation of an umbilical hernia. Mr. Brandon Simon tells me that he noted an abdominal wall bulge at his umbilicus approximately 2 weeks ago. Associated abdominal pain at umbilicus. The overlying skin also became red. The bulge has decreased in size as has the redness of the skin. Abdominal pain has improved as well. No associated nausea or vomitting. No constipation. He has otherwise been in his usual state of health. Abdominal ultrasound - 5/6/2019 - Small periumbilical hernia containing only fat. Past Medical History:  No date: Elevated PSA  No date: Hypercholesterolemia  No date: Seasonal allergies  8/13/4942: Umbilical hernia without obstruction and without gangrene    Past Surgical History:  No date: ENDOSCOPY, COLON, DIAGNOSTIC      Comment:  10, due 15 v 20    Review of patient's family history indicates:  Problem: Cancer      Relation: Father          Age of Onset: (Not Specified)          Comment: pancreatic  Problem: Hypertension      Relation: Father          Age of Onset: (Not Specified)  Problem: Cancer      Relation: Paternal Grandmother          Age of Onset: (Not Specified)          Comment: lung    Social History: Employment - , [employer]. Tobacco - Denies. EtOH - Approx. 3 drinks per week. Review of systems negative except as noted. Review of Systems   Constitutional: Negative for chills and fever. Respiratory: Negative for cough. Gastrointestinal: Negative for abdominal pain, constipation, nausea and vomiting. Physical Exam   Constitutional: He appears well-developed and well-nourished. No distress. HENT:   Head: Normocephalic and atraumatic. Eyes: No scleral icterus. Neck: Neck supple. Cardiovascular: Normal rate and regular rhythm. Pulmonary/Chest: Effort normal and breath sounds normal.   Abdominal: Soft.  He exhibits no distension. There is no tenderness. There is no rebound and no guarding. A hernia is present. Hernia confirmed positive in the ventral area (At umbilicus. ). Musculoskeletal: Normal range of motion. Lymphadenopathy:     He has no cervical adenopathy. Neurological: He is alert. Vitals reviewed. ASSESSMENT and PLAN  Reviewed ultrasound. In view of the findings on H and P and ultrasound, Mr. Paulo Khoury should benefit from repair of the umbilical hernia as it is symptomatic. Discussed umbilical hernia repair, possibly with mesh, with him today including risks of bleeding, infection, hernia recurrence. He understands and wishes to proceed. Mr. Paulo Khoury will contact the office to schedule surgery of if any questions or concerns arise. Follow up with Dr. Loi Gray as scheduled.      CC: Jalyn Barrera DO

## 2019-08-02 DIAGNOSIS — E78.00 HYPERCHOLESTEROLEMIA: ICD-10-CM

## 2019-08-02 RX ORDER — SIMVASTATIN 80 MG/1
TABLET, FILM COATED ORAL
Qty: 30 TAB | Refills: 5 | Status: SHIPPED | OUTPATIENT
Start: 2019-08-02 | End: 2020-02-07 | Stop reason: SDUPTHER

## 2019-12-02 ENCOUNTER — TELEPHONE (OUTPATIENT)
Dept: SURGERY | Age: 54
End: 2019-12-02

## 2019-12-02 NOTE — TELEPHONE ENCOUNTER
Returned patients voicemail re: scheduling surgery. Patient advised that he would need to see Dr Pelon Hopkins again in office prior to scheduling as we have not seen him since May 2019. Patient also advised that his insurance on file is showing Ineligible, and he would need to provide updated insurance info/card at the time of his office appointment.

## 2020-02-07 ENCOUNTER — OFFICE VISIT (OUTPATIENT)
Dept: INTERNAL MEDICINE CLINIC | Age: 55
End: 2020-02-07

## 2020-02-07 VITALS
DIASTOLIC BLOOD PRESSURE: 78 MMHG | RESPIRATION RATE: 20 BRPM | BODY MASS INDEX: 34.42 KG/M2 | HEART RATE: 74 BPM | WEIGHT: 206.6 LBS | HEIGHT: 65 IN | SYSTOLIC BLOOD PRESSURE: 126 MMHG | TEMPERATURE: 97.5 F | OXYGEN SATURATION: 97 %

## 2020-02-07 DIAGNOSIS — F98.8 ATTENTION DEFICIT DISORDER (ADD) IN ADULT: ICD-10-CM

## 2020-02-07 DIAGNOSIS — E66.9 OBESITY (BMI 30.0-34.9): ICD-10-CM

## 2020-02-07 DIAGNOSIS — E78.00 HYPERCHOLESTEROLEMIA: Primary | ICD-10-CM

## 2020-02-07 DIAGNOSIS — R73.03 PREDIABETES: ICD-10-CM

## 2020-02-07 DIAGNOSIS — K42.9 UMBILICAL HERNIA WITHOUT OBSTRUCTION AND WITHOUT GANGRENE: ICD-10-CM

## 2020-02-07 RX ORDER — SIMVASTATIN 80 MG/1
TABLET, FILM COATED ORAL
Qty: 90 TAB | Refills: 1 | Status: SHIPPED | OUTPATIENT
Start: 2020-02-07 | End: 2022-02-02

## 2020-02-07 NOTE — PROGRESS NOTES
Health Maintenance Due   Topic Date Due    Shingrix Vaccine Age 49> (1 of 2) 12/08/2017    Influenza Age 5 to Adult  08/01/2019       Chief Complaint   Patient presents with    Diabetes     f/u care    Vitamin D Deficiency    Cholesterol Problem       1. Have you been to the ER, urgent care clinic since your last visit? Hospitalized since your last visit? No    2. Have you seen or consulted any other health care providers outside of the [address] [place] [address] since your last visit? Include any pap smears or colon screening. No    3) Do you have an Advance Directive on file? no    4) Are you interested in receiving information on Advance Directives? NO      Patient is accompanied by self I have received verbal consent from Slick Patel to discuss any/all medical information while they are present in the room.

## 2020-02-11 NOTE — PROGRESS NOTES
HISTORY OF PRESENT ILLNESS  Ketty Neal is a 48 y.o. male. Pt. comes in for f/u. Has a few chronic medical issues as documented. He is obese. Has a chronic umbilical hernia. Denies any major issues with it except for occasional pain. Denies any other GI or  problems. He has prediabetes. Remains on medication for high cholesterol. Followed by psychiatrist for ADD. Medications help. Has chronic allergies as well. PMH/PSH/Allergies/Social History/medication list and most recent studies reviewed with patient. Tobacco use: No  Alcohol use: Social    Reports compliance with medications and diet. Trying to be active physically to control weight. Reports no other new c/o. HPI    Review of Systems   Constitutional: Negative. HENT: Negative. Eyes: Negative. Respiratory: Negative for shortness of breath. Cardiovascular: Negative for chest pain and leg swelling. Gastrointestinal: Negative. Negative for abdominal pain. Genitourinary: Negative. Negative for dysuria. Musculoskeletal: Negative. Skin: Negative. Negative for rash. Neurological: Negative for dizziness, sensory change, focal weakness and headaches. Endo/Heme/Allergies: Negative. Psychiatric/Behavioral: Negative for depression. All other systems reviewed and are negative. Physical Exam  Vitals signs and nursing note reviewed. Constitutional:       General: He is not in acute distress. Appearance: He is well-developed. He is obese. Comments: Pleasant  Obese   HENT:      Head: Normocephalic and atraumatic. Nose: Nose normal.      Mouth/Throat:      Mouth: Mucous membranes are moist.   Eyes:      Conjunctiva/sclera: Conjunctivae normal.   Neck:      Musculoskeletal: Normal range of motion and neck supple. Thyroid: No thyromegaly. Cardiovascular:      Rate and Rhythm: Normal rate and regular rhythm. Heart sounds: Normal heart sounds. No murmur.    Pulmonary:      Effort: Pulmonary effort is normal. No respiratory distress. Breath sounds: Normal breath sounds. No wheezing or rales. Abdominal:      General: Bowel sounds are normal. There is no distension. Palpations: Abdomen is soft. Tenderness: There is no abdominal tenderness. Comments: Obese  umbilical hernia, chronic   Musculoskeletal: Normal range of motion. General: No tenderness. Skin:     General: Skin is warm and dry. Findings: No rash. Neurological:      Mental Status: He is alert and oriented to person, place, and time. Coordination: Coordination normal.   Psychiatric:         Behavior: Behavior normal.         ASSESSMENT and PLAN  Diagnoses and all orders for this visit:    1. Hypercholesterolemia  -     LIPID PANEL  -     METABOLIC PANEL, COMPREHENSIVE  -     CBC W/O DIFF  -     PSA, DIAGNOSTIC (PROSTATE SPECIFIC AG)  -     HEMOGLOBIN A1C WITH EAG  -     simvastatin (ZOCOR) 80 mg tablet; TAKE 1 TABLET BY MOUTH EVERY NIGHT AT BEDTIME    2. Prediabetes    3. Umbilical hernia without obstruction and without gangrene  -     REFERRAL TO GENERAL SURGERY    4. Attention deficit disorder (ADD) in adult    5. Obesity (BMI 30.0-34. 9)      Follow-up and Dispositions    · Return in about 6 months (around 8/7/2020).      lab results and schedule of future lab studies reviewed with patient  reviewed diet, exercise and weight control  reviewed medications and side effects in detail  F/u with other MD's as scheduled  Advised patient to lose weight by watching diet (decreasing sugars/carbs/fat, increasing fruits/vegetables), exercising at least 30 minutes daily, getting 7-8 hours of sleep daily, drinking plenty of water, and decreasing stress  Overall stable

## 2020-06-16 LAB
ALBUMIN SERPL-MCNC: 4.3 G/DL (ref 3.8–4.9)
ALBUMIN/GLOB SERPL: 1.7 {RATIO} (ref 1.2–2.2)
ALP SERPL-CCNC: 65 IU/L (ref 39–117)
ALT SERPL-CCNC: 27 IU/L (ref 0–44)
AST SERPL-CCNC: 28 IU/L (ref 0–40)
BILIRUB SERPL-MCNC: 1 MG/DL (ref 0–1.2)
BUN SERPL-MCNC: 14 MG/DL (ref 6–24)
BUN/CREAT SERPL: 13 (ref 9–20)
CALCIUM SERPL-MCNC: 9.6 MG/DL (ref 8.7–10.2)
CHLORIDE SERPL-SCNC: 102 MMOL/L (ref 96–106)
CHOLEST SERPL-MCNC: 220 MG/DL (ref 100–199)
CO2 SERPL-SCNC: 25 MMOL/L (ref 20–29)
CREAT SERPL-MCNC: 1.06 MG/DL (ref 0.76–1.27)
ERYTHROCYTE [DISTWIDTH] IN BLOOD BY AUTOMATED COUNT: 12.9 % (ref 11.6–15.4)
EST. AVERAGE GLUCOSE BLD GHB EST-MCNC: 114 MG/DL
GLOBULIN SER CALC-MCNC: 2.5 G/DL (ref 1.5–4.5)
GLUCOSE SERPL-MCNC: 107 MG/DL (ref 65–99)
HBA1C MFR BLD: 5.6 % (ref 4.8–5.6)
HCT VFR BLD AUTO: 46.3 % (ref 37.5–51)
HDLC SERPL-MCNC: 55 MG/DL
HGB BLD-MCNC: 16.1 G/DL (ref 13–17.7)
INTERPRETATION, 910389: NORMAL
LDLC SERPL CALC-MCNC: 130 MG/DL (ref 0–99)
MCH RBC QN AUTO: 31.8 PG (ref 26.6–33)
MCHC RBC AUTO-ENTMCNC: 34.8 G/DL (ref 31.5–35.7)
MCV RBC AUTO: 92 FL (ref 79–97)
PLATELET # BLD AUTO: 232 X10E3/UL (ref 150–450)
POTASSIUM SERPL-SCNC: 4.5 MMOL/L (ref 3.5–5.2)
PROT SERPL-MCNC: 6.8 G/DL (ref 6–8.5)
PSA SERPL-MCNC: 1.7 NG/ML (ref 0–4)
RBC # BLD AUTO: 5.06 X10E6/UL (ref 4.14–5.8)
SODIUM SERPL-SCNC: 141 MMOL/L (ref 134–144)
TRIGL SERPL-MCNC: 174 MG/DL (ref 0–149)
VLDLC SERPL CALC-MCNC: 35 MG/DL (ref 5–40)
WBC # BLD AUTO: 6.1 X10E3/UL (ref 3.4–10.8)

## 2020-07-07 NOTE — PROGRESS NOTES
Cholesterol is improved but still high  Other labs okay  Continue same medications  Needs a follow-up virtual visit

## 2020-07-08 ENCOUNTER — TELEPHONE (OUTPATIENT)
Dept: INTERNAL MEDICINE CLINIC | Age: 55
End: 2020-07-08

## 2020-07-08 NOTE — PROGRESS NOTES
Called patient to advise of results. Cholesterol is still high, I left message asking patient to call back and schedule a VV.

## 2020-07-10 ENCOUNTER — TELEPHONE (OUTPATIENT)
Dept: INTERNAL MEDICINE CLINIC | Age: 55
End: 2020-07-10

## 2020-07-10 DIAGNOSIS — E78.00 HYPERCHOLESTEROLEMIA: ICD-10-CM

## 2020-07-10 DIAGNOSIS — E55.9 VITAMIN D DEFICIENCY: ICD-10-CM

## 2020-07-10 DIAGNOSIS — F98.8 ATTENTION DEFICIT DISORDER (ADD) IN ADULT: ICD-10-CM

## 2020-07-10 DIAGNOSIS — E34.9 HYPOTESTOSTERONISM: Primary | ICD-10-CM

## 2020-07-10 DIAGNOSIS — E78.1 HYPERTRIGLYCERIDEMIA: ICD-10-CM

## 2020-07-10 RX ORDER — LAMOTRIGINE 25 MG/1
25 TABLET ORAL DAILY
Qty: 60 TAB | Refills: 0 | Status: CANCELLED | OUTPATIENT
Start: 2020-07-10

## 2020-07-10 RX ORDER — CHOLECALCIFEROL (VITAMIN D3) 125 MCG
2000 CAPSULE ORAL
Qty: 12 TAB | Refills: 0 | Status: CANCELLED | OUTPATIENT
Start: 2020-07-10 | End: 2020-09-08

## 2020-07-10 RX ORDER — SIMVASTATIN 80 MG/1
TABLET, FILM COATED ORAL
Qty: 60 TAB | Refills: 0 | Status: CANCELLED | OUTPATIENT
Start: 2020-07-10

## 2020-07-10 NOTE — TELEPHONE ENCOUNTER
I have called patient and spoke with him. He is aware of lab results, concerns with cholesterol. He is advised to follow up with new PCP in 1-2 months as his insurance has changed. Provider ok to refill medications for 2 month supply.

## 2020-07-10 NOTE — TELEPHONE ENCOUNTER
Pt insurance changed to a plan [facility] does not accept.  He had to cancel his appointment today due to the out of pocket cost. Pt would like for the nurse to contact him to discuss his lab results

## 2020-07-13 RX ORDER — CHOLECALCIFEROL (VITAMIN D3) 125 MCG
2000 CAPSULE ORAL 3 TIMES WEEKLY
Qty: 30 TAB | Refills: 0 | OUTPATIENT
Start: 2020-07-13 | End: 2020-08-12

## 2020-07-13 RX ORDER — SIMVASTATIN 80 MG/1
TABLET, FILM COATED ORAL
Qty: 30 TAB | Refills: 0 | Status: CANCELLED | OUTPATIENT
Start: 2020-07-13

## 2020-07-13 RX ORDER — LAMOTRIGINE 25 MG/1
25 TABLET ORAL DAILY
Qty: 60 TAB | Refills: 0 | OUTPATIENT
Start: 2020-07-13

## 2021-06-08 ENCOUNTER — HOSPITAL ENCOUNTER (EMERGENCY)
Age: 56
Discharge: HOME | End: 2021-06-08
Attending: EMERGENCY MEDICINE

## 2021-06-08 ENCOUNTER — APPOINTMENT (OUTPATIENT)
Dept: CT IMAGING | Age: 56
End: 2021-06-08
Attending: EMERGENCY MEDICINE

## 2021-06-08 VITALS
RESPIRATION RATE: 16 BRPM | HEART RATE: 74 BPM | OXYGEN SATURATION: 100 % | SYSTOLIC BLOOD PRESSURE: 103 MMHG | TEMPERATURE: 97 F | DIASTOLIC BLOOD PRESSURE: 70 MMHG

## 2021-06-08 DIAGNOSIS — R11.10 VOMITING, INTRACTABILITY OF VOMITING NOT SPECIFIED, PRESENCE OF NAUSEA NOT SPECIFIED, UNSPECIFIED VOMITING TYPE: ICD-10-CM

## 2021-06-08 DIAGNOSIS — R10.84 ABDOMINAL PAIN, GENERALIZED: Primary | ICD-10-CM

## 2021-06-08 LAB
ALBUMIN SERPL-MCNC: 3.6 G/DL (ref 3.5–5)
ALBUMIN/GLOB SERPL: 1 {RATIO} (ref 1.1–2.2)
ALP SERPL-CCNC: 76 U/L (ref 45–117)
ALT SERPL-CCNC: 31 U/L (ref 12–78)
ANION GAP SERPL CALC-SCNC: 7 MMOL/L (ref 5–15)
APPEARANCE UR: CLEAR
AST SERPL-CCNC: 22 U/L (ref 15–37)
ATRIAL RATE: 68 BPM
BASOPHILS # BLD: 0 K/UL (ref 0–0.1)
BASOPHILS NFR BLD: 0 % (ref 0–1)
BILIRUB SERPL-MCNC: 0.8 MG/DL (ref 0.2–1)
BILIRUB UR QL: NEGATIVE
BUN SERPL-MCNC: 13 MG/DL (ref 6–20)
BUN/CREAT SERPL: 14 (ref 12–20)
CALCIUM SERPL-MCNC: 9.2 MG/DL (ref 8.5–10.1)
CALCULATED P AXIS, ECG09: 32 DEGREES
CALCULATED R AXIS, ECG10: 32 DEGREES
CALCULATED T AXIS, ECG11: 8 DEGREES
CHLORIDE SERPL-SCNC: 107 MMOL/L (ref 97–108)
CO2 SERPL-SCNC: 28 MMOL/L (ref 21–32)
COLOR UR: NORMAL
COMMENT, HOLDF: NORMAL
CREAT SERPL-MCNC: 0.91 MG/DL (ref 0.7–1.3)
DIAGNOSIS, 93000: NORMAL
DIFFERENTIAL METHOD BLD: NORMAL
EOSINOPHIL # BLD: 0.1 K/UL (ref 0–0.4)
EOSINOPHIL NFR BLD: 1 % (ref 0–7)
ERYTHROCYTE [DISTWIDTH] IN BLOOD BY AUTOMATED COUNT: 13.1 % (ref 11.5–14.5)
GLOBULIN SER CALC-MCNC: 3.7 G/DL (ref 2–4)
GLUCOSE SERPL-MCNC: 93 MG/DL (ref 65–100)
GLUCOSE UR STRIP.AUTO-MCNC: NEGATIVE MG/DL
HCT VFR BLD AUTO: 46.3 % (ref 36.6–50.3)
HGB BLD-MCNC: 15.5 G/DL (ref 12.1–17)
HGB UR QL STRIP: NEGATIVE
IMM GRANULOCYTES # BLD AUTO: 0 K/UL (ref 0–0.04)
IMM GRANULOCYTES NFR BLD AUTO: 0 % (ref 0–0.5)
KETONES UR QL STRIP.AUTO: NEGATIVE MG/DL
LEUKOCYTE ESTERASE UR QL STRIP.AUTO: NEGATIVE
LIPASE SERPL-CCNC: 74 U/L (ref 73–393)
LYMPHOCYTES # BLD: 1.6 K/UL (ref 0.8–3.5)
LYMPHOCYTES NFR BLD: 18 % (ref 12–49)
MCH RBC QN AUTO: 31.5 PG (ref 26–34)
MCHC RBC AUTO-ENTMCNC: 33.5 G/DL (ref 30–36.5)
MCV RBC AUTO: 94.1 FL (ref 80–99)
MONOCYTES # BLD: 0.9 K/UL (ref 0–1)
MONOCYTES NFR BLD: 10 % (ref 5–13)
NEUTS SEG # BLD: 6.4 K/UL (ref 1.8–8)
NEUTS SEG NFR BLD: 71 % (ref 32–75)
NITRITE UR QL STRIP.AUTO: NEGATIVE
NRBC # BLD: 0 K/UL (ref 0–0.01)
NRBC BLD-RTO: 0 PER 100 WBC
P-R INTERVAL, ECG05: 160 MS
PH UR STRIP: 5 [PH] (ref 5–8)
PLATELET # BLD AUTO: 207 K/UL (ref 150–400)
PMV BLD AUTO: 10.6 FL (ref 8.9–12.9)
POTASSIUM SERPL-SCNC: 4.2 MMOL/L (ref 3.5–5.1)
PROT SERPL-MCNC: 7.3 G/DL (ref 6.4–8.2)
PROT UR STRIP-MCNC: NEGATIVE MG/DL
Q-T INTERVAL, ECG07: 376 MS
QRS DURATION, ECG06: 100 MS
QTC CALCULATION (BEZET), ECG08: 399 MS
RBC # BLD AUTO: 4.92 M/UL (ref 4.1–5.7)
SAMPLES BEING HELD,HOLD: NORMAL
SODIUM SERPL-SCNC: 142 MMOL/L (ref 136–145)
SP GR UR REFRACTOMETRY: 1.03 (ref 1–1.03)
TROPONIN I SERPL-MCNC: <0.05 NG/ML
UROBILINOGEN UR QL STRIP.AUTO: 1 EU/DL (ref 0.2–1)
VENTRICULAR RATE, ECG03: 68 BPM
WBC # BLD AUTO: 9 K/UL (ref 4.1–11.1)

## 2021-06-08 PROCEDURE — 83690 ASSAY OF LIPASE: CPT

## 2021-06-08 PROCEDURE — 74011000636 HC RX REV CODE- 636: Performed by: EMERGENCY MEDICINE

## 2021-06-08 PROCEDURE — 74177 CT ABD & PELVIS W/CONTRAST: CPT

## 2021-06-08 PROCEDURE — 81003 URINALYSIS AUTO W/O SCOPE: CPT

## 2021-06-08 PROCEDURE — 84484 ASSAY OF TROPONIN QUANT: CPT

## 2021-06-08 PROCEDURE — 93005 ELECTROCARDIOGRAM TRACING: CPT

## 2021-06-08 PROCEDURE — 99283 EMERGENCY DEPT VISIT LOW MDM: CPT

## 2021-06-08 PROCEDURE — 85025 COMPLETE CBC W/AUTO DIFF WBC: CPT

## 2021-06-08 PROCEDURE — 36415 COLL VENOUS BLD VENIPUNCTURE: CPT

## 2021-06-08 PROCEDURE — 80053 COMPREHEN METABOLIC PANEL: CPT

## 2021-06-08 PROCEDURE — 96374 THER/PROPH/DIAG INJ IV PUSH: CPT

## 2021-06-08 PROCEDURE — 74011250636 HC RX REV CODE- 250/636: Performed by: EMERGENCY MEDICINE

## 2021-06-08 RX ORDER — ONDANSETRON 2 MG/ML
4 INJECTION INTRAMUSCULAR; INTRAVENOUS
Status: COMPLETED | OUTPATIENT
Start: 2021-06-08 | End: 2021-06-08

## 2021-06-08 RX ORDER — ONDANSETRON 4 MG/1
4 TABLET, ORALLY DISINTEGRATING ORAL
Qty: 20 TABLET | Refills: 0 | Status: SHIPPED | OUTPATIENT
Start: 2021-06-08 | End: 2022-02-02

## 2021-06-08 RX ORDER — DICYCLOMINE HYDROCHLORIDE 20 MG/1
20 TABLET ORAL EVERY 6 HOURS
Qty: 20 TABLET | Refills: 0 | Status: SHIPPED | OUTPATIENT
Start: 2021-06-08 | End: 2022-02-02

## 2021-06-08 RX ADMIN — IOPAMIDOL 100 ML: 755 INJECTION, SOLUTION INTRAVENOUS at 13:51

## 2021-06-08 RX ADMIN — ONDANSETRON 4 MG: 2 INJECTION INTRAMUSCULAR; INTRAVENOUS at 14:48

## 2021-06-08 RX ADMIN — SODIUM CHLORIDE 1000 ML: 9 INJECTION, SOLUTION INTRAVENOUS at 14:45

## 2021-06-08 NOTE — ED PROVIDER NOTES
HPI patient is a 14-year-old male with past medical history significant for hypercholesterolemia, elevated PSA, seasonal allergies and umbilical hernia who presents to the ED reporting abrupt onset of upper abdominal pain and copious amounts of nonbloody vomiting since last evening. Denies fever, cold symptoms, headache, neck pain, visual changes, focal weakness or rash. Denies any difficulty breathing, difficulty swallowing, SOB or chest pain. Denies any nausea, vomiting or diarrhea. Pt. Reports that he has not had any food medications or fluids since yesterday. Pain was somewhat relieved in the supine position. Past Medical History:   Diagnosis Date    Elevated PSA     Hypercholesterolemia     Seasonal allergies     Umbilical hernia without obstruction and without gangrene 2019       Past Surgical History:   Procedure Laterality Date    ENDOSCOPY, COLON, DIAGNOSTIC      10, due 15 v 20         Family History:   Problem Relation Age of Onset    Cancer Father         pancreatic    Hypertension Father     Cancer Paternal Grandmother         lung       Social History     Socioeconomic History    Marital status:      Spouse name: Not on file    Number of children: Not on file    Years of education: Not on file    Highest education level: Not on file   Occupational History    Not on file   Tobacco Use    Smoking status: Former Smoker     Quit date: 2003     Years since quittin.8    Smokeless tobacco: Never Used   Substance and Sexual Activity    Alcohol use:  Yes     Alcohol/week: 2.5 standard drinks     Types: 3 Glasses of wine per week     Comment: 3 per week    Drug use: No    Sexual activity: Yes     Partners: Female     Comment:  has one child   Other Topics Concern    Not on file   Social History Narrative    Not on file     Social Determinants of Health     Financial Resource Strain:     Difficulty of Paying Living Expenses:    Food Insecurity:     Worried About Running Out of Food in the Last Year:     Yobany of Food in the Last Year:    Transportation Needs:     Lack of Transportation (Medical):  Lack of Transportation (Non-Medical):    Physical Activity:     Days of Exercise per Week:     Minutes of Exercise per Session:    Stress:     Feeling of Stress :    Social Connections:     Frequency of Communication with Friends and Family:     Frequency of Social Gatherings with Friends and Family:     Attends Alevism Services:     Active Member of Clubs or Organizations:     Attends Club or Organization Meetings:     Marital Status:    Intimate Partner Violence:     Fear of Current or Ex-Partner:     Emotionally Abused:     Physically Abused:     Sexually Abused: ALLERGIES: Patient has no known allergies. Review of Systems   Constitutional: Positive for appetite change. Negative for activity change, fever and unexpected weight change. HENT: Negative for congestion, rhinorrhea and trouble swallowing. Eyes: Negative for visual disturbance. Respiratory: Negative for cough and shortness of breath. Cardiovascular: Negative for chest pain, palpitations and leg swelling. Gastrointestinal: Positive for abdominal distention, abdominal pain, nausea and vomiting. Genitourinary: Negative for dysuria and flank pain. Musculoskeletal: Negative for arthralgias and myalgias. Skin: Negative for rash. Neurological: Negative for dizziness, light-headedness and headaches. All other systems reviewed and are negative. Vitals:    06/08/21 1325   BP: 103/70   Pulse: 74   Resp: 16   Temp: 97 °F (36.1 °C)   SpO2: 100%            Physical Exam  Vitals and nursing note reviewed. Constitutional:       General: He is not in acute distress. Appearance: He is well-developed. He is not ill-appearing, toxic-appearing or diaphoretic. Comments: Male; non smoker;      HENT:      Head: Normocephalic.       Mouth/Throat:      Mouth: Mucous membranes are moist.   Cardiovascular:      Rate and Rhythm: Normal rate and regular rhythm. Pulmonary:      Effort: Pulmonary effort is normal.      Breath sounds: Normal breath sounds. Abdominal:      General: Bowel sounds are normal. There is distension. There are no signs of injury. Tenderness: There is abdominal tenderness in the periumbilical area. There is no guarding or rebound. Hernia: A hernia is present. Hernia is present in the umbilical area. Skin:     General: Skin is warm and dry. Findings: Rash present. Neurological:      General: No focal deficit present. Mental Status: He is alert and oriented to person, place, and time. Psychiatric:         Mood and Affect: Mood normal.         Behavior: Behavior normal.          MDM       Procedures    EKG reveals normal sinus rhythm with a ventricular rate of 68; reviewed by Dr. Sima Tate, COMPREHENSIVE - Abnormal; Notable for the following components:       Result Value    A-G Ratio 1.0 (*)     All other components within normal limits   CBC WITH AUTOMATED DIFF   TROPONIN I   LIPASE   SAMPLES BEING HELD   URINALYSIS W/ RFLX MICROSCOPIC     CT ABD PELV W CONT    Result Date: 6/8/2021  1. Nonspecific appearing of a few loops of small bowel in the left midabdomen with air-fluid levels as well as one single loop that is minimally distended at 3.3 cm. There is no point of transition. This may represent ileus and/or enteritis. 2. Concentric mural thickening in a decompressed bladder. Recommend clinical correlation for cystitis. 3. Fat-containing umbilical hernia. 4. Incidental findings as above. Has been reexamined and is tolerating p.o. fluids well and reports some improvement with medications given. Recommend slow diet progression with fluids. Discharged home with some Bentyl and Zofran for symptom control as needed.   Reviewed recommendations for urgent return to the ER and for follow-up with gastroenterology. 4:31 PM  Patient's results and plan of care  have been reviewed with the pt and his wife. Patient and/or family have verbally conveyed their understanding and agreement of the patient's signs, symptoms, diagnosis, treatment and prognosis and additionally agree to follow up as recommended or return to the Emergency Room should his condition change prior to follow-up. Discharge instructions have also been provided to the patient with some educational information regarding his diagnosis as well a list of reasons why he would want to return to the ER prior to his follow-up appointment should his condition change. Josesito Naylor NP    Discussed plan of care with Dr. Amberly Gilbert.  Josesito Naylor NP

## 2021-06-08 NOTE — LETTER
NOTIFICATION RETURN TO WORK / SCHOOL 
 
6/8/2021 4:35 PM 
 
Mr. Maria Elena Mena Dianelys. [facility] [address] [id number] To Whom It May Concern: 
 
Maria Elena Mena is currently under the care of [facility] [place] [facility]. He will return to work/school on: 6/10/2021 If there are questions or concerns please have the patient contact our office. Sincerely, Darin Garcia NP

## 2022-02-02 ENCOUNTER — OFFICE VISIT (OUTPATIENT)
Dept: INTERNAL MEDICINE CLINIC | Age: 57
End: 2022-02-02
Payer: COMMERCIAL

## 2022-02-02 VITALS
DIASTOLIC BLOOD PRESSURE: 72 MMHG | HEART RATE: 94 BPM | SYSTOLIC BLOOD PRESSURE: 122 MMHG | BODY MASS INDEX: 34.61 KG/M2 | OXYGEN SATURATION: 98 % | TEMPERATURE: 98.3 F | WEIGHT: 208 LBS

## 2022-02-02 DIAGNOSIS — Z23 ENCOUNTER FOR IMMUNIZATION: ICD-10-CM

## 2022-02-02 DIAGNOSIS — E78.00 HYPERCHOLESTEROLEMIA: ICD-10-CM

## 2022-02-02 DIAGNOSIS — E34.9 HYPOTESTOSTERONISM: ICD-10-CM

## 2022-02-02 DIAGNOSIS — Z00.00 WELL ADULT EXAM: Primary | ICD-10-CM

## 2022-02-02 DIAGNOSIS — F98.8 ATTENTION DEFICIT DISORDER (ADD) IN ADULT: ICD-10-CM

## 2022-02-02 DIAGNOSIS — Z11.59 NEED FOR HEPATITIS C SCREENING TEST: ICD-10-CM

## 2022-02-02 DIAGNOSIS — E66.9 OBESITY (BMI 30.0-34.9): ICD-10-CM

## 2022-02-02 DIAGNOSIS — E55.9 VITAMIN D DEFICIENCY: ICD-10-CM

## 2022-02-02 PROCEDURE — 90686 IIV4 VACC NO PRSV 0.5 ML IM: CPT | Performed by: INTERNAL MEDICINE

## 2022-02-02 PROCEDURE — 99396 PREV VISIT EST AGE 40-64: CPT | Performed by: INTERNAL MEDICINE

## 2022-02-02 RX ORDER — SILDENAFIL 100 MG/1
100 TABLET, FILM COATED ORAL
Qty: 10 TABLET | Refills: 2 | Status: SHIPPED | OUTPATIENT
Start: 2022-02-02

## 2022-02-02 RX ORDER — ROSUVASTATIN CALCIUM 10 MG/1
40 TABLET, COATED ORAL DAILY
COMMUNITY
End: 2022-03-16 | Stop reason: SDUPTHER

## 2022-02-02 NOTE — PROGRESS NOTES
Health Maintenance Due   Topic Date Due    Hepatitis C Screening  Never done    Depression Screen  Never done    Shingrix Vaccine Age 50> (1 of 2) Never done    Colorectal Cancer Screening Combo  09/01/2020    A1C test (Diabetic or Prediabetic)  06/15/2021    Lipid Screen  06/15/2021    Flu Vaccine (1) 09/01/2021       Chief Complaint   Patient presents with    Cholesterol Problem    Attention Deficit Disorder    Anxiety       1. Have you been to the ER, urgent care clinic since your last visit? Hospitalized since your last visit? No    2. Have you seen or consulted any other health care providers outside of the [address] [place] [address] since your last visit? Include any pap smears or colon screening. No    3) Do you have an Advance Directive on file? no    4) Are you interested in receiving information on Advance Directives? NO      Patient is accompanied by self I have received verbal consent from Larissa Parmar to discuss any/all medical information while they are present in the room.

## 2022-02-10 NOTE — PROGRESS NOTES
HISTORY OF PRESENT ILLNESS  Socorro Parra is a 49 y.o. male. Pt. comes in for for physical and f/u. Has a few chronic medical issues as documented including HLD, obesity, prediabetes, vitamin D deficiency, low testosterone with ED. Vital signs are stable. He is obese with weight 208 pounds. Reports feeling okay overall. Remains on Crestor. No side effects. Denies any other GI or  problems. Followed by psychiatrist for ADD. On Lamictal and Vyvanse. Takes some supplements. Has had COVID vaccination. Denies any related issues. Due for repeat labs. Needs medication refills. PMH/PSH/Allergies/Social History/medication list and most recent studies reviewed with patient. Tobacco use: No  Alcohol use: Social  Reports compliance with medications and diet. Trying to be active physically but not losing much weight. Reports no other new c/o. HPI    Review of Systems   Constitutional: Negative. HENT: Negative. Eyes: Negative. Respiratory: Negative for shortness of breath. Cardiovascular: Negative for chest pain and leg swelling. Gastrointestinal: Negative. Negative for abdominal pain. Genitourinary: Negative. Negative for dysuria. Musculoskeletal: Negative. Skin: Negative. Negative for rash. Neurological: Negative for dizziness, sensory change, focal weakness and headaches. Endo/Heme/Allergies: Negative. Psychiatric/Behavioral: Negative for depression. All other systems reviewed and are negative. Physical Exam  Vitals and nursing note reviewed. Constitutional:       General: He is not in acute distress. Appearance: He is well-developed. He is obese. Comments: Pleasant   HENT:      Head: Normocephalic and atraumatic. Nose: Nose normal.      Mouth/Throat:      Mouth: Mucous membranes are moist.   Eyes:      General: No scleral icterus. Conjunctiva/sclera: Conjunctivae normal.   Neck:      Thyroid: No thyromegaly.    Cardiovascular:      Rate and Rhythm: Normal rate and regular rhythm. Heart sounds: Normal heart sounds. No murmur heard. Pulmonary:      Effort: Pulmonary effort is normal. No respiratory distress. Breath sounds: Normal breath sounds. No wheezing or rales. Abdominal:      General: Bowel sounds are normal. There is no distension. Palpations: Abdomen is soft. Tenderness: There is no abdominal tenderness. There is no right CVA tenderness or left CVA tenderness. Comments: Obese  umbilical hernia, chronic   Musculoskeletal:         General: No tenderness. Normal range of motion. Cervical back: Normal range of motion and neck supple. Right lower leg: No edema. Left lower leg: No edema. Skin:     General: Skin is warm and dry. Findings: No rash. Neurological:      Mental Status: He is alert and oriented to person, place, and time. Coordination: Coordination normal.   Psychiatric:         Behavior: Behavior normal.         ASSESSMENT and PLAN  Diagnoses and all orders for this visit:    1. Well adult exam  -     LIPID PANEL; Future  -     METABOLIC PANEL, COMPREHENSIVE; Future  -     CBC W/O DIFF; Future  -     HEMOGLOBIN A1C WITH EAG; Future  -     TSH 3RD GENERATION; Future  -     VITAMIN D, 25 HYDROXY; Future  -     PSA SCREENING (SCREENING); Future  -     TESTOSTERONE, FREE & TOTAL; Future    2. Hypercholesterolemia  Watch fatty food, increase activity, lose weight  Labs as recommended  3. Hypotestosteronism  -     TESTOSTERONE, FREE & TOTAL; Future    4. Vitamin D deficiency  -     VITAMIN D, 25 HYDROXY; Future    5. Obesity (BMI 30.0-34. 9)  Advised patient to lose weight by watching diet (decreasing sugars/carbs/fat, increasing fruits/vegetables), exercising at least 30 minutes daily, getting 7-8 hours of sleep daily, drinking plenty of water, and decreasing stress  6. Attention deficit disorder (ADD) in adult    7. Need for hepatitis C screening test  -     HEPATITIS C AB; Future    8. Encounter for immunization  -     INFLUENZA VIRUS VAC QUAD,SPLIT,PRESV FREE SYRINGE IM    Other orders  -     sildenafil citrate (Viagra) 100 mg tablet; Take 1 Tablet by mouth daily as needed for Erectile Dysfunction. Potential side effects discussed. Follow-up and Dispositions    · Return in about 6 months (around 8/2/2022). All chronic medical problems are stable  Continue with current medical management and plan  lab results and schedule of future lab studies reviewed with patient  reviewed diet, exercise and weight control  reviewed medications and side effects in detail  F/u with other MD's/ providers as scheduled  COVID-19 precautions discussed with pt  An After Visit Summary was printed and given to the patient.

## 2022-03-18 PROBLEM — E66.9 OBESITY (BMI 30.0-34.9): Status: ACTIVE | Noted: 2020-02-07

## 2022-03-18 PROBLEM — K42.9 UMBILICAL HERNIA WITHOUT OBSTRUCTION AND WITHOUT GANGRENE: Status: ACTIVE | Noted: 2019-05-22

## 2022-03-18 PROBLEM — E66.811 OBESITY (BMI 30.0-34.9): Status: ACTIVE | Noted: 2020-02-07

## 2022-03-19 PROBLEM — M1A.9XX0 CHRONIC GOUT INVOLVING TOE OF LEFT FOOT WITHOUT TOPHUS: Status: ACTIVE | Noted: 2017-06-19

## 2022-03-19 PROBLEM — F41.9 ANXIETY: Status: ACTIVE | Noted: 2019-04-22

## 2022-03-20 PROBLEM — E66.01 SEVERE OBESITY (HCC): Status: ACTIVE | Noted: 2019-02-14

## 2022-03-20 PROBLEM — F98.8 ATTENTION DEFICIT DISORDER (ADD) IN ADULT: Status: ACTIVE | Noted: 2019-04-22

## 2022-10-18 DIAGNOSIS — E78.00 HYPERCHOLESTEROLEMIA: ICD-10-CM

## 2022-10-18 RX ORDER — ROSUVASTATIN CALCIUM 10 MG/1
40 TABLET, COATED ORAL DAILY
Qty: 90 TABLET | Refills: 1 | Status: SHIPPED | OUTPATIENT
Start: 2022-10-18

## 2022-10-18 NOTE — TELEPHONE ENCOUNTER
Requested Prescriptions     Pending Prescriptions Disp Refills    rosuvastatin (Crestor) 10 mg tablet 90 Tablet 1     Sig: Take 4 Tablets by mouth daily. Take 40 mg by mouth daily. 8/2/2022 is LAST OFFICE VISIT     No future appointments.       [id number] @ [address] [place] [address] [address] [facility] [address]  Phone: [phone] Fax: [phone]

## 2023-02-03 ENCOUNTER — OFFICE VISIT (OUTPATIENT)
Dept: INTERNAL MEDICINE CLINIC | Age: 58
End: 2023-02-03
Payer: COMMERCIAL

## 2023-02-03 VITALS
SYSTOLIC BLOOD PRESSURE: 132 MMHG | HEART RATE: 78 BPM | OXYGEN SATURATION: 99 % | HEIGHT: 65 IN | BODY MASS INDEX: 33.99 KG/M2 | WEIGHT: 204 LBS | DIASTOLIC BLOOD PRESSURE: 70 MMHG | TEMPERATURE: 98 F | RESPIRATION RATE: 16 BRPM

## 2023-02-03 DIAGNOSIS — R07.9 CHEST PAIN, UNSPECIFIED TYPE: ICD-10-CM

## 2023-02-03 DIAGNOSIS — Z12.11 COLON CANCER SCREENING: ICD-10-CM

## 2023-02-03 DIAGNOSIS — E78.00 HYPERCHOLESTEROLEMIA: ICD-10-CM

## 2023-02-03 DIAGNOSIS — H91.93 BILATERAL HEARING LOSS, UNSPECIFIED HEARING LOSS TYPE: ICD-10-CM

## 2023-02-03 DIAGNOSIS — Z00.00 WELL ADULT EXAM: Primary | ICD-10-CM

## 2023-02-03 DIAGNOSIS — K42.9 UMBILICAL HERNIA WITHOUT OBSTRUCTION AND WITHOUT GANGRENE: ICD-10-CM

## 2023-02-03 DIAGNOSIS — H93.13 TINNITUS OF BOTH EARS: ICD-10-CM

## 2023-02-03 DIAGNOSIS — F98.8 ATTENTION DEFICIT DISORDER (ADD) IN ADULT: ICD-10-CM

## 2023-02-03 DIAGNOSIS — E66.9 OBESITY (BMI 30.0-34.9): ICD-10-CM

## 2023-02-03 DIAGNOSIS — Z11.59 NEED FOR HEPATITIS C SCREENING TEST: ICD-10-CM

## 2023-02-03 DIAGNOSIS — E55.9 VITAMIN D DEFICIENCY: ICD-10-CM

## 2023-02-03 PROCEDURE — 99396 PREV VISIT EST AGE 40-64: CPT | Performed by: INTERNAL MEDICINE

## 2023-02-03 RX ORDER — ROSUVASTATIN CALCIUM 10 MG/1
40 TABLET, COATED ORAL DAILY
Qty: 90 TABLET | Refills: 1 | Status: SHIPPED | OUTPATIENT
Start: 2023-02-03

## 2023-02-03 NOTE — PROGRESS NOTES
Health Maintenance Due   Topic Date Due    Hepatitis C Screening  Never done    Shingles Vaccine (1 of 2) Never done    Colorectal Cancer Screening Combo  09/01/2020    A1C test (Diabetic or Prediabetic)  06/15/2021    Lipid Screen  06/15/2021    COVID-19 Vaccine (4 - Booster for Pfizer series) 01/31/2022    Flu Vaccine (1) 08/01/2022    DTaP/Tdap/Td series (2 - Td or Tdap) 10/15/2022    Depression Screen  02/02/2023       Chief Complaint   Patient presents with    Cholesterol Problem    Attention Deficit Disorder    Follow Up Chronic Condition       1. Have you been to the ER, urgent care clinic since your last visit? Hospitalized since your last visit? No    2. Have you seen or consulted any other health care providers outside of the [address] [place] [address] since your last visit? Include any pap smears or colon screening. No    3) Do you have an Advance Directive on file? no    4) Are you interested in receiving information on Advance Directives? NO      Patient is accompanied by self I have received verbal consent from Bailey Avila to discuss any/all medical information while they are present in the room.

## 2023-02-14 ENCOUNTER — TELEPHONE (OUTPATIENT)
Dept: SURGERY | Age: 58
End: 2023-02-14

## 2023-02-28 NOTE — PROGRESS NOTES
Subjective  Clifford Whyte is a 56 y.o. male. Pt. comes in for f/u. Has a few chronic medical issues as documented. Reports having occasional chest pain. Has had some dyspnea and VILLALBA. Concerned about underlying heart disease. Remains on Vyvanse and lamotrigine for ADD. Reports issues with tinnitus and hearing loss. Also has an umbilical hernia which has been bothering him. All other chronic medical issues are stable on current treatment regimen. Denies any issues with  Covid-19 vaccination. PMH/PSH/Allergies/Social History/medication list and most recent studies reviewed with patient. Tobacco use: No  Alcohol use: Social  Reports compliance with medications and diet. Trying to be active physically to control weight. Reports no other new c/o. Past Medical History:   Diagnosis Date    Elevated PSA     Hypercholesterolemia     Seasonal allergies     Umbilical hernia without obstruction and without gangrene 5/22/2019       No Known Allergies    Current Outpatient Medications on File Prior to Visit   Medication Sig Dispense Refill    sildenafil citrate (Viagra) 100 mg tablet Take 1 Tablet by mouth daily as needed for Erectile Dysfunction. 10 Tablet 2    lamoTRIgine (LAMICTAL) 25 mg tablet       lisdexamfetamine (VYVANSE) 30 mg capsule Take 30 mg by mouth every morning. Omega-3 Fatty Acids 300 mg cap Take  by mouth.      multivitamin (ONE A DAY) tablet Take 1 Tab by mouth daily. cholecalciferol, vitamin D3, 50 mcg (2,000 unit) tab Take  by mouth. 3 x weekly       No current facility-administered medications on file prior to visit. Visit Vitals  Blood Pressure 132/70 (BP 1 Location: Left upper arm, BP Patient Position: Sitting, BP Cuff Size: Adult)   Pulse 78   Temperature 98 °F (36.7 °C) (Oral)   Respiration 16   Height 5' 5\" (1.651 m)   Weight 204 lb (92.5 kg)   Oxygen Saturation 99%   Body Mass Index 33.95 kg/m²     HPI  Review of Systems   Constitutional: Negative.     HENT: Negative. Eyes: Negative. Respiratory:  Negative for shortness of breath. Cardiovascular:  Negative for chest pain and leg swelling. Gastrointestinal: Negative. Negative for abdominal pain. Genitourinary: Negative. Negative for dysuria. Musculoskeletal: Negative. Skin: Negative. Negative for rash. Neurological:  Negative for dizziness, sensory change, focal weakness and headaches. Endo/Heme/Allergies: Negative. Psychiatric/Behavioral:  Negative for depression. All other systems reviewed and are negative. Objective  Physical Exam  Vitals and nursing note reviewed. Constitutional:       General: He is not in acute distress. Appearance: He is well-developed. He is obese. Comments: Pleasant   HENT:      Head: Normocephalic and atraumatic. Nose: Nose normal.      Mouth/Throat:      Mouth: Mucous membranes are moist.   Eyes:      General: No scleral icterus. Conjunctiva/sclera: Conjunctivae normal.      Pupils: Pupils are equal, round, and reactive to light. Neck:      Thyroid: No thyromegaly. Vascular: No carotid bruit. Cardiovascular:      Rate and Rhythm: Normal rate and regular rhythm. Heart sounds: Normal heart sounds. No murmur heard. Pulmonary:      Effort: Pulmonary effort is normal. No respiratory distress. Breath sounds: Normal breath sounds. No wheezing or rales. Chest:      Chest wall: No tenderness. Abdominal:      General: Bowel sounds are normal. There is no distension. Palpations: Abdomen is soft. Tenderness: There is no abdominal tenderness. There is no right CVA tenderness or left CVA tenderness. Comments: Obese  umbilical hernia, chronic   Musculoskeletal:         General: No tenderness. Normal range of motion. Cervical back: Normal range of motion and neck supple. Right lower leg: No edema. Left lower leg: No edema. Lymphadenopathy:      Cervical: No cervical adenopathy.    Skin:     General: Skin is warm and dry. Findings: No rash. Neurological:      Mental Status: He is alert and oriented to person, place, and time. Coordination: Coordination normal.   Psychiatric:         Behavior: Behavior normal.        Assessment & Plan    ICD-10-CM ICD-9-CM    1. Well adult exam  Z00.00 V70.0 LIPID PANEL      METABOLIC PANEL, COMPREHENSIVE      CBC W/O DIFF      HEMOGLOBIN A1C WITH EAG      TSH 3RD GENERATION      PSA SCREENING (SCREENING)      2. Hypercholesterolemia  E78.00 272.0 ECHO STRESS      rosuvastatin (Crestor) 10 mg tablet      3. Vitamin D deficiency  E55.9 268.9 VITAMIN D, 25 HYDROXY      4. Obesity (BMI 30.0-34. 9)  E66.9 278.00       5. Attention deficit disorder (ADD) in adult  F98.8 314.00       6. Chest pain, unspecified type  R07.9 786.50 ECHO STRESS      7. Need for hepatitis C screening test  Z11.59 V73.89 HEPATITIS C AB      8. Tinnitus of both ears  H93.13 388.30       9.  Bilateral hearing loss, unspecified hearing loss type  H91.93 389.9 REFERRAL TO ENT-OTOLARYNGOLOGY      10. Colon cancer screening  Z12.11 V76.51 REFERRAL TO GASTROENTEROLOGY      11. Umbilical hernia without obstruction and without gangrene  K42.9 553.1 REFERRAL TO GENERAL SURGERY        Orders Placed This Encounter    LIPID PANEL     Standing Status:   Future     Standing Expiration Date:   0/1/2502    METABOLIC PANEL, COMPREHENSIVE     Standing Status:   Future     Standing Expiration Date:   2/3/2024    CBC W/O DIFF     Standing Status:   Future     Standing Expiration Date:   2/3/2024    HEMOGLOBIN A1C WITH EAG     Standing Status:   Future     Standing Expiration Date:   2/3/2024    TSH 3RD GENERATION     Standing Status:   Future     Standing Expiration Date:   2/3/2024    VITAMIN D, 25 HYDROXY     Standing Status:   Future     Standing Expiration Date:   2/3/2024    PSA SCREENING (SCREENING)     Standing Status:   Future     Standing Expiration Date:   2/3/2024    HEPATITIS C AB     Standing Status: Future     Standing Expiration Date:   2/3/2024    [facility]     Referral Priority:   Routine     Referral Type:   Consultation     Referral Reason:   Specialty Services Required     Referred to Provider:   Gopal Christopher MD     Number of Visits Requested:   1    [facility]     Referral Priority:   Routine     Referral Type:   Consultation     Referral Reason:   Specialty Services Required     Referred to Provider:   Teagan Thurston MD     Number of Visits Requested:   [facility]     Referral Priority:   Routine     Referral Type:   Consultation     Referral Reason:   Specialty Services Required     Referred to Provider:   Kriss Rivera MD     Number of Visits Requested:   1    ECHO STRESS     Standing Status:   Future     Standing Expiration Date:   8/3/2023     Order Specific Question:   Contrast Enhancement (Bubble Study, Definity, Optison) may be used if criteria listed in established evidence-based protocol has been identified. Answer:   Yes     Order Specific Question:   Enhanced Imaging (Myocardial Strain, 3D post-processing) may be used if criteria listed in established evidence-based protocol has been identified. Answer:   Yes     Order Specific Question:   Stressing Agent     Answer:   Exercise    rosuvastatin (Crestor) 10 mg tablet     Sig: Take 4 Tablets by mouth daily. Take 40 mg by mouth daily. Dispense:  90 Tablet     Refill:  1     Follow-up and Dispositions    Return in about 6 months (around 8/3/2023). All chronic medical problems are stable  Continue with current medical management and plan  lab results and schedule of future lab studies reviewed with patient  reviewed diet, exercise and weight control  reviewed medications and side effects in detail  F/u with other MD's/ providers as scheduled  COVID-19 precautions discussed with pt  An After Visit Summary was printed and given to the patient.     Annelise Grant DO

## 2023-03-05 PROBLEM — Z00.00 WELL ADULT EXAM: Status: RESOLVED | Noted: 2023-02-03 | Resolved: 2023-03-05

## 2023-05-25 RX ORDER — SILDENAFIL 100 MG/1
100 TABLET, FILM COATED ORAL DAILY PRN
COMMUNITY
Start: 2022-02-02

## 2023-05-25 RX ORDER — ROSUVASTATIN CALCIUM 10 MG/1
40 TABLET, COATED ORAL DAILY
COMMUNITY
Start: 2023-02-03 | End: 2023-07-14 | Stop reason: SDUPTHER

## 2023-05-25 RX ORDER — LAMOTRIGINE 25 MG/1
TABLET ORAL
COMMUNITY
Start: 2019-05-05

## 2023-07-10 DIAGNOSIS — E78.1 HYPERTRIGLYCERIDEMIA: ICD-10-CM

## 2023-07-10 DIAGNOSIS — E34.9 HYPOTESTOSTERONISM: Primary | ICD-10-CM

## 2023-07-10 NOTE — TELEPHONE ENCOUNTER
----- Message from Julio Sample sent at 7/10/2023  1:02 PM EDT -----  Subject: Refill Request    QUESTIONS  Name of Medication? rosuvastatin (CRESTOR) 10 MG tablet  Patient-reported dosage and instructions? 1x daily  How many days do you have left? 0  [facility]? [id number] phone number (if available)? [phone]  ---------------------------------------------------------------------------  --------------  CALL BACK INFO  What is the best way for the office to contact you? OK to leave message on   voicemail  Preferred Call Back Phone Number? [phone]  ---------------------------------------------------------------------------  --------------  SCRIPT ANSWERS  Relationship to Patient?  Self

## 2023-07-13 NOTE — TELEPHONE ENCOUNTER
Requested Prescriptions     Pending Prescriptions Disp Refills    rosuvastatin (CRESTOR) 10 MG tablet 180 tablet 1     Sig: Take 4 tablets by mouth daily         [id number] @ [address] [facility], [address] [place] [address] [phone] Verline Fall [phone]  [facility] [address]  Phone: [phone] Fax: [phone]       Last appt 2/3/2023      Future Appointments   Date Time Provider [address] [place] [address]   8/11/2023  2:45 PM DO MILLICENT Braun BS AMB

## 2023-07-14 RX ORDER — ROSUVASTATIN CALCIUM 40 MG/1
40 TABLET, COATED ORAL DAILY
Qty: 30 TABLET | Refills: 5 | Status: SHIPPED | OUTPATIENT
Start: 2023-07-14

## 2023-07-14 RX ORDER — ROSUVASTATIN CALCIUM 10 MG/1
40 TABLET, COATED ORAL DAILY
Qty: 180 TABLET | Refills: 1 | Status: SHIPPED | OUTPATIENT
Start: 2023-07-14 | End: 2023-07-14 | Stop reason: SDUPTHER

## 2023-08-28 ENCOUNTER — CLINICAL DOCUMENTATION (OUTPATIENT)
Age: 58
End: 2023-08-28

## 2023-08-28 ENCOUNTER — NURSE TRIAGE (OUTPATIENT)
Dept: OTHER | Facility: CLINIC | Age: 58
End: 2023-08-28
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
## 2023-10-26 ENCOUNTER — TELEPHONE (OUTPATIENT)
Age: 58
End: 2023-10-26

## 2023-10-26 NOTE — TELEPHONE ENCOUNTER
----- Message from Brenda Green sent at 10/25/2023  3:18 PM EDT -----  Subject: Referral Request    Reason for referral request? patient needs labs done before appt on friday   10/27 @ 2:30. Provider patient wants to be referred to(if known):     Provider Phone Number(if known):     Additional Information for Provider? patient would like it sent to lab   xavier  ---------------------------------------------------------------------------  --------------  [address]    [phone]; OK to leave message on voicemail  ---------------------------------------------------------------------------  --------------

## 2023-10-26 NOTE — TELEPHONE ENCOUNTER
Returned call to pt and left VM, Provider now in the office to order labs and results would not be back in time for appt tomorrow.

## 2023-10-27 ENCOUNTER — OFFICE VISIT (OUTPATIENT)
Age: 58
End: 2023-10-27

## 2023-10-27 VITALS
OXYGEN SATURATION: 99 % | TEMPERATURE: 98 F | RESPIRATION RATE: 16 BRPM | HEART RATE: 74 BPM | WEIGHT: 198 LBS | SYSTOLIC BLOOD PRESSURE: 134 MMHG | BODY MASS INDEX: 32.99 KG/M2 | DIASTOLIC BLOOD PRESSURE: 74 MMHG | HEIGHT: 65 IN

## 2023-10-27 DIAGNOSIS — E55.9 VITAMIN D DEFICIENCY: ICD-10-CM

## 2023-10-27 DIAGNOSIS — H93.13 TINNITUS, BILATERAL: ICD-10-CM

## 2023-10-27 DIAGNOSIS — E66.9 OBESITY (BMI 30.0-34.9): ICD-10-CM

## 2023-10-27 DIAGNOSIS — E78.2 MIXED HYPERLIPIDEMIA: ICD-10-CM

## 2023-10-27 DIAGNOSIS — Z23 FLU VACCINE NEED: ICD-10-CM

## 2023-10-27 DIAGNOSIS — E78.2 MIXED HYPERLIPIDEMIA: Primary | ICD-10-CM

## 2023-10-27 PROCEDURE — 99214 OFFICE O/P EST MOD 30 MIN: CPT | Performed by: INTERNAL MEDICINE

## 2023-10-27 RX ORDER — ROSUVASTATIN CALCIUM 40 MG/1
40 TABLET, COATED ORAL DAILY
Qty: 90 TABLET | Refills: 1 | Status: SHIPPED | OUTPATIENT
Start: 2023-10-27

## 2023-10-27 SDOH — ECONOMIC STABILITY: HOUSING INSECURITY
IN THE LAST 12 MONTHS, WAS THERE A TIME WHEN YOU DID NOT HAVE A STEADY PLACE TO SLEEP OR SLEPT IN A SHELTER (INCLUDING NOW)?: NO

## 2023-10-27 SDOH — ECONOMIC STABILITY: FOOD INSECURITY: WITHIN THE PAST 12 MONTHS, THE FOOD YOU BOUGHT JUST DIDN'T LAST AND YOU DIDN'T HAVE MONEY TO GET MORE.: NEVER TRUE

## 2023-10-27 SDOH — ECONOMIC STABILITY: FOOD INSECURITY: WITHIN THE PAST 12 MONTHS, YOU WORRIED THAT YOUR FOOD WOULD RUN OUT BEFORE YOU GOT MONEY TO BUY MORE.: NEVER TRUE

## 2023-10-27 SDOH — ECONOMIC STABILITY: INCOME INSECURITY: HOW HARD IS IT FOR YOU TO PAY FOR THE VERY BASICS LIKE FOOD, HOUSING, MEDICAL CARE, AND HEATING?: NOT VERY HARD

## 2023-10-27 ASSESSMENT — ANXIETY QUESTIONNAIRES
5. BEING SO RESTLESS THAT IT IS HARD TO SIT STILL: 0
2. NOT BEING ABLE TO STOP OR CONTROL WORRYING: 0
IF YOU CHECKED OFF ANY PROBLEMS ON THIS QUESTIONNAIRE, HOW DIFFICULT HAVE THESE PROBLEMS MADE IT FOR YOU TO DO YOUR WORK, TAKE CARE OF THINGS AT HOME, OR GET ALONG WITH OTHER PEOPLE: NOT DIFFICULT AT ALL
6. BECOMING EASILY ANNOYED OR IRRITABLE: 0
7. FEELING AFRAID AS IF SOMETHING AWFUL MIGHT HAPPEN: 0
3. WORRYING TOO MUCH ABOUT DIFFERENT THINGS: 0
4. TROUBLE RELAXING: 0
GAD7 TOTAL SCORE: 0
1. FEELING NERVOUS, ANXIOUS, OR ON EDGE: 0

## 2023-10-27 ASSESSMENT — PATIENT HEALTH QUESTIONNAIRE - PHQ9
SUM OF ALL RESPONSES TO PHQ QUESTIONS 1-9: 2
DEPRESSION UNABLE TO ASSESS: URGENT/EMERGENT SITUATION
SUM OF ALL RESPONSES TO PHQ QUESTIONS 1-9: 2
1. LITTLE INTEREST OR PLEASURE IN DOING THINGS: 1
SUM OF ALL RESPONSES TO PHQ QUESTIONS 1-9: 2
SUM OF ALL RESPONSES TO PHQ9 QUESTIONS 1 & 2: 2
2. FEELING DOWN, DEPRESSED OR HOPELESS: 1
SUM OF ALL RESPONSES TO PHQ QUESTIONS 1-9: 2

## 2023-10-31 ASSESSMENT — ENCOUNTER SYMPTOMS
SHORTNESS OF BREATH: 0
ALLERGIC/IMMUNOLOGIC NEGATIVE: 1
GASTROINTESTINAL NEGATIVE: 1
ABDOMINAL PAIN: 0
RESPIRATORY NEGATIVE: 1
EYES NEGATIVE: 1

## 2023-10-31 NOTE — PROGRESS NOTES
1. \"Have you been to the ER, urgent care clinic since your last visit? Hospitalized since your last visit? \" No    2. \"Have you seen or consulted any other health care providers outside of the [address] [place] [address] since your last visit? \" No    3. For patients aged 43-73: Has the patient had a colonoscopy / FIT/ Cologuard? Recommendation: Colonoscopy every 10y or annual FIT test from 50-75 or every 3 year stool DNA based test with consideration of ongoing screening from 76-85. If the patient is female:    4. For patients aged 43-66: Has the patient had a mammogram within the past 2 years? No    5. For patients aged 21-65: Has the patient had a pap smear?  No
Behavior: Behavior normal.            Assessment & Plan   Diagnosis Orders   1. Mixed hyperlipidemia  PSA Screening    Vitamin D 25 Hydroxy    TSH    Hemoglobin A1C    CBC    Comprehensive Metabolic Panel    Lipid Panel  Continue Crestor as directed      2. Tinnitus, bilateral  Follow-up with ENT as recommended previously      3. Obesity (BMI 30.0-34. 9)  Advised patient to lose weight by watching diet (decreasing sugars/carbs/fat, increasing fruits/vegetables), exercising at least 30 minutes daily, getting 7-8 hours of sleep daily, drinking plenty of water, and decreasing stress        4. Vitamin D deficiency  Vitamin D 25 Hydroxy      5. Flu vaccine need            Orders Placed This Encounter    PSA Screening     Standing Status:   Future     Number of Occurrences:   1     Standing Expiration Date:   10/27/2024    Vitamin D 25 Hydroxy     Standing Status:   Future     Number of Occurrences:   1     Standing Expiration Date:   10/27/2024    TSH     Standing Status:   Future     Number of Occurrences:   1     Standing Expiration Date:   10/27/2024    Hemoglobin A1C     Standing Status:   Future     Number of Occurrences:   1     Standing Expiration Date:   10/27/2024    CBC     Standing Status:   Future     Number of Occurrences:   1     Standing Expiration Date:   10/27/2024    Comprehensive Metabolic Panel     Standing Status:   Future     Number of Occurrences:   1     Standing Expiration Date:   10/27/2024    Lipid Panel     Standing Status:   Future     Number of Occurrences:   1     Standing Expiration Date:   10/27/2024    rosuvastatin (CRESTOR) 40 MG tablet     Sig: Take 1 tablet by mouth daily     Dispense:  90 tablet     Refill:  1        No follow-ups on file.      All chronic medical problems are stable  Continue with current medical management and plan  lab results and schedule of future lab studies reviewed with patient  reviewed diet, exercise and weight control  reviewed medications and side effects

## 2024-01-31 ENCOUNTER — TELEPHONE (OUTPATIENT)
Age: 59
End: 2024-01-31

## 2024-01-31 NOTE — TELEPHONE ENCOUNTER
Patient called wanting to know if  would be willing to take over prescribing vyvanse for him or is there someone else in the practice he could see that would do that for him. His call back number is [phone]

## 2024-04-18 ENCOUNTER — OFFICE VISIT (OUTPATIENT)
Age: 59
End: 2024-04-18

## 2024-04-18 VITALS
BODY MASS INDEX: 33.2 KG/M2 | TEMPERATURE: 97.6 F | DIASTOLIC BLOOD PRESSURE: 76 MMHG | HEIGHT: 65 IN | HEART RATE: 70 BPM | RESPIRATION RATE: 18 BRPM | SYSTOLIC BLOOD PRESSURE: 115 MMHG | WEIGHT: 199.3 LBS | OXYGEN SATURATION: 95 %

## 2024-04-18 DIAGNOSIS — J20.9 ACUTE BRONCHITIS, UNSPECIFIED ORGANISM: Primary | ICD-10-CM

## 2024-04-18 RX ORDER — AZITHROMYCIN 250 MG/1
TABLET, FILM COATED ORAL
Qty: 6 TABLET | Refills: 0 | Status: SHIPPED | OUTPATIENT
Start: 2024-04-18 | End: 2024-04-28

## 2024-04-18 RX ORDER — PREDNISONE 10 MG/1
TABLET ORAL
Qty: 21 EACH | Refills: 0 | Status: SHIPPED | OUTPATIENT
Start: 2024-04-18

## 2024-04-18 RX ORDER — ALBUTEROL SULFATE 90 UG/1
2 AEROSOL, METERED RESPIRATORY (INHALATION) 4 TIMES DAILY PRN
Qty: 18 G | Refills: 0 | Status: SHIPPED | OUTPATIENT
Start: 2024-04-18

## 2024-04-18 RX ORDER — BENZONATATE 200 MG/1
200 CAPSULE ORAL 3 TIMES DAILY PRN
Qty: 21 CAPSULE | Refills: 0 | Status: SHIPPED | OUTPATIENT
Start: 2024-04-18 | End: 2024-04-25

## 2024-04-18 ASSESSMENT — ENCOUNTER SYMPTOMS
COUGH: 1
SHORTNESS OF BREATH: 0

## 2024-04-18 NOTE — PROGRESS NOTES
Subjective     Chief Complaint   Patient presents with    Cough     Chest Congestion, Cough x3-4. Took mucinex, robitussin, and a antihistamine; SOB          Patient is 58 year old male presenting with cough and chest congestion that began on Friday.  He has been using antihistamines, Mucinex and Robitussin.  He began feeling fatigue on .  He reports some wheezing at night time.   He had productive cough initially at first but has since become less so.   He has history of pneumonia.  Denies fever or chills.  Denies appetite changes.          Past Medical History:   Diagnosis Date    Elevated PSA     Hypercholesterolemia     Seasonal allergies     Umbilical hernia without obstruction and without gangrene 2019       Past Surgical History:   Procedure Laterality Date    COLONOSCOPY      10, due 15 v 20       Family History   Problem Relation Age of Onset    Hypertension Father     Cancer Paternal Grandmother         lung    Cancer Father         pancreatic       No Known Allergies    Social History     Tobacco Use    Smoking status: Former     Current packs/day: 0.00     Average packs/day: 0.5 packs/day for 0.5 years (0.2 ttl pk-yrs)     Types: Cigarettes     Start date: 2003     Quit date: 2003     Years since quittin.7    Smokeless tobacco: Never   Vaping Use    Vaping Use: Never used   Substance Use Topics    Alcohol use: Yes     Alcohol/week: 2.5 standard drinks of alcohol     Types: 3 Standard drinks or equivalent per week     Comment: beer    Drug use: Yes     Types: Marijuana (Weed)     Comment: edible       Vitals:    24 0926   BP: 115/76   Pulse: 70   Resp: 18   Temp: 97.6 °F (36.4 °C)   SpO2: 95%       Review of Systems   Constitutional:  Positive for fatigue. Negative for appetite change, chills and fever.   HENT:  Positive for congestion.    Respiratory:  Positive for cough. Negative for shortness of breath.        Objective     Physical Exam  Constitutional:       General:

## 2024-04-18 NOTE — PATIENT INSTRUCTIONS
Thank you for visiting [facility] today.    Nasal Congestion:  Flonase (over the counter) nasal spray, once a day  Saline irrigation kits help wash out sinuses 1-2 times a day  Normal saline nasal spray  Afrin nasal spray for no more than 3-5 days    Cough:  Throat lozenges, hot tea, and honey may help  Vicks VapoRub at night to help with cough and relieve muscles aches and pain  If not prescribed a cough medication, Delsym or Robitussin are options.  It is an over the counter cough medication containing dextromethorphan to help suppress cough at night  If you have high blood pressure, take Coricidin HBP (or the generic form) instead.  Follow instructions on the box.  For thick mucus, take Mucinex (with Guafenesin only) to help thin the mucus.  Follow instructions on the box.  You will need to drink plenty of water with this medication.    Sore Throat:  Lozenges, as needed. Cepacol lozenges will help numb the throat  Chloraseptic spray also helps to numb throat pain  Salt water gargles to soothe throat pain    Sinus pain/pressure:  Warm, wet towel on face to help with facial sinus pain/pressure    Headache/Pain Fever/Body Aches:  If you can take NSAIDs, take Ibuprofen 400-800mg every 8 hours as needed  If you cannot take NSAIDs, take Tylenol 325-500mg every 6 hours as needed    Miscellanous:  Zyrtec/Xyzal/Allegra/Claritin during the day or Benadryl at night may help with allergies.  These medications also come in decongestant forms and may be used if you are having nasal/sinus congestion.  Simple foods like chicken noodle soup, smoothies, hot tea with lemon and honey may also provide some relief.  Cool mist humidifier  Stay hydrated  Vitamin C 75-90 mg daily  Zinc 40 mg daily    Please follow up with your primary care provider within 2-5 days if your signs and symptoms have not resolved or worsened.  Please be mindful of features that warrant immediate attention:  new onset fever, difficulty breathing,

## 2024-04-24 ENCOUNTER — OFFICE VISIT (OUTPATIENT)
Age: 59
End: 2024-04-24
Payer: COMMERCIAL

## 2024-04-24 ENCOUNTER — HOSPITAL ENCOUNTER (OUTPATIENT)
Facility: HOSPITAL | Age: 59
Discharge: HOME | End: 2024-04-27
Payer: COMMERCIAL

## 2024-04-24 VITALS
HEIGHT: 65 IN | DIASTOLIC BLOOD PRESSURE: 78 MMHG | HEART RATE: 84 BPM | WEIGHT: 201 LBS | SYSTOLIC BLOOD PRESSURE: 124 MMHG | TEMPERATURE: 97.7 F | OXYGEN SATURATION: 97 % | RESPIRATION RATE: 16 BRPM | BODY MASS INDEX: 33.49 KG/M2

## 2024-04-24 DIAGNOSIS — J06.9 URTI (ACUTE UPPER RESPIRATORY INFECTION): ICD-10-CM

## 2024-04-24 DIAGNOSIS — J06.9 URTI (ACUTE UPPER RESPIRATORY INFECTION): Primary | ICD-10-CM

## 2024-04-24 DIAGNOSIS — J30.1 SEASONAL ALLERGIC RHINITIS DUE TO POLLEN: ICD-10-CM

## 2024-04-24 PROCEDURE — 71046 X-RAY EXAM CHEST 2 VIEWS: CPT

## 2024-04-24 PROCEDURE — 99213 OFFICE O/P EST LOW 20 MIN: CPT | Performed by: INTERNAL MEDICINE

## 2024-04-24 RX ORDER — FLUTICASONE PROPIONATE 50 MCG
1 SPRAY, SUSPENSION (ML) NASAL DAILY
Qty: 32 G | Refills: 1 | Status: SHIPPED | OUTPATIENT
Start: 2024-04-24

## 2024-04-24 RX ORDER — CETIRIZINE HYDROCHLORIDE 10 MG/1
10 TABLET ORAL
Qty: 30 TABLET | Refills: 0 | Status: SHIPPED | OUTPATIENT
Start: 2024-04-24

## 2024-04-24 ASSESSMENT — ENCOUNTER SYMPTOMS
EYES NEGATIVE: 1
GASTROINTESTINAL NEGATIVE: 1
WHEEZING: 1
COUGH: 1

## 2024-04-24 ASSESSMENT — PATIENT HEALTH QUESTIONNAIRE - PHQ9
1. LITTLE INTEREST OR PLEASURE IN DOING THINGS: NOT AT ALL
SUM OF ALL RESPONSES TO PHQ QUESTIONS 1-9: 0
2. FEELING DOWN, DEPRESSED OR HOPELESS: NOT AT ALL
SUM OF ALL RESPONSES TO PHQ9 QUESTIONS 1 & 2: 0
SUM OF ALL RESPONSES TO PHQ QUESTIONS 1-9: 0

## 2024-04-24 NOTE — PROGRESS NOTES
\"Have you been to the ER, urgent care clinic since your last visit?  Hospitalized since your last visit?\"    YES - When: approximately 7 days ago.  Where and Why: URI.    “Have you seen or consulted any other health care providers outside of [facility] since your last visit?”    NO      Chief Complaint   Patient presents with    URI    Cough           Click Here for Release of Records Request  
but no infection. The throat appears normal. The nasal turbinates are slightly red.  The glands in the neck are normal.  There are a few wheezing in the lungs, but no pneumonia or crackles are heard.  Cardiovascular system: S1, S2 normal, regular rate and rhythm.  Abdomen: Soft, nontender, nondistended, bowel sound present.  Vital Signs  The patient's blood pressure is 124/78, pulse 84, temperature is normal, and oxygen level is at 97 percent.          Jeremy was seen today for uri and cough.    Diagnoses and all orders for this visit:    URTI (acute upper respiratory infection)  -     XR CHEST (2 VW); Future    Seasonal allergic rhinitis due to pollen  -     cetirizine (ZYRTEC) 10 MG tablet; Take 1 tablet by mouth nightly as needed for Allergies  -     fluticasone (FLONASE) 50 MCG/ACT nasal spray; 1 spray by Each Nostril route daily        Assessment & Plan  1. Cough and gurgling.  Upon examination, the patient appears to be in good health. The patient is advised to use Debrox, 5 drops twice daily for a duration of 5 days. A prescription for Zyrtec, to be taken as 1 tablet at night as needed, has been issued, with a total of 30 tablets. Additionally, Flonase, 1 spray in each nostril twice daily, has been prescribed for daytime use. The patient is also advised to consume warm water  1 teaspoon of and local honey on top of his tongue. A chest x-ray has been ordered.      The patient (or guardian, if applicable) and other individuals in attendance with the patient were advised that Artificial Intelligence will be utilized during this visit to record and process the conversation to generate a clinical note. The patient (or guardian, if applicable) and other individuals in attendance at the appointment consented to the use of AI, including the recording.      Follow-up Disposition:   Return if symptoms worsen or fail to improve.   Advised him to call back or return to office if symptoms worsen/change/persist.   Discussed

## 2024-04-26 ENCOUNTER — TELEMEDICINE (OUTPATIENT)
Age: 59
End: 2024-04-26
Payer: COMMERCIAL

## 2024-04-26 DIAGNOSIS — E78.2 MIXED HYPERLIPIDEMIA: ICD-10-CM

## 2024-04-26 DIAGNOSIS — J30.1 SEASONAL ALLERGIC RHINITIS DUE TO POLLEN: ICD-10-CM

## 2024-04-26 DIAGNOSIS — R05.8 RESPIRATORY TRACT CONGESTION WITH COUGH: Primary | ICD-10-CM

## 2024-04-26 DIAGNOSIS — Z87.01 HISTORY OF PNEUMONIA: ICD-10-CM

## 2024-04-26 DIAGNOSIS — E66.9 OBESITY (BMI 30.0-34.9): ICD-10-CM

## 2024-04-26 PROCEDURE — 99214 OFFICE O/P EST MOD 30 MIN: CPT | Performed by: INTERNAL MEDICINE

## 2024-04-26 RX ORDER — DOXYCYCLINE HYCLATE 100 MG
100 TABLET ORAL 2 TIMES DAILY
Qty: 20 TABLET | Refills: 0 | Status: SHIPPED | OUTPATIENT
Start: 2024-04-26 | End: 2024-05-06

## 2024-04-26 RX ORDER — CODEINE PHOSPHATE/GUAIFENESIN 10-100MG/5
5 LIQUID (ML) ORAL 3 TIMES DAILY PRN
Qty: 118 ML | Refills: 0 | Status: SHIPPED | OUTPATIENT
Start: 2024-04-26 | End: 2024-05-04

## 2024-04-26 RX ORDER — GUAIFENESIN AND DEXTROMETHORPHAN HYDROBROMIDE 600; 30 MG/1; MG/1
TABLET, EXTENDED RELEASE ORAL
Qty: 14 TABLET | Refills: 0 | Status: SHIPPED | OUTPATIENT
Start: 2024-04-26

## 2024-04-26 NOTE — PROGRESS NOTES
Jeremy Cassdiy, was evaluated through a synchronous (real-time) audio-video encounter. The patient (or guardian if applicable) is aware that this is a billable service, which includes applicable co-pays. This Virtual Visit was conducted with patient's (and/or legal guardian's) consent. Patient identification was verified, and a caregiver was present when appropriate.   The patient was located at Home: [address] [place] [id number]  Provider was located at Home [place]  Confirm you are appropriately licensed, registered, or certified to deliver care in the state where the patient is located as indicated above. If you are not or unsure, please re-schedule the visit: Yes, I confirm.     Jeremy Cassidy (:  1965) is a Established patient, presenting virtually for evaluation of the following:    Assessment & Plan   Below is the assessment and plan developed based on review of pertinent history, physical exam, labs, studies, and medications.  1. Respiratory tract congestion with cough  -     guaiFENesin-codeine (TUSSI-ORGANIDIN NR) 100-10 MG/5ML syrup; Take 5 mLs by mouth 3 times daily as needed for Cough for up to 8 days. Max Daily Amount: 15 mLs, Disp-118 mL, R-0Normal  Doxycycline 100 mg 1 twice daily for 10 days  Mucinex DM 1 twice daily for 7 days  Continue Zyrtec and Flonase as directed  Keep up with fluids  2. History of pneumonia  Reassurance that chest x-ray from 2 days ago was normal  3. Seasonal allergic rhinitis due to pollen  -     guaiFENesin-codeine (TUSSI-ORGANIDIN NR) 100-10 MG/5ML syrup; Take 5 mLs by mouth 3 times daily as needed for Cough for up to 8 days. Max Daily Amount: 15 mLs, Disp-118 mL, R-0Normal  Zyrtec and Flonase as directed  4. Mixed hyperlipidemia  Continue Crestor as directed  He has not done labs as recommended few months ago  5. Obesity (BMI 30.0-34.9)  Advised patient to lose weight by watching diet (decreasing sugars/carbs/fat, increasing

## 2024-08-27 DIAGNOSIS — E55.9 VITAMIN D DEFICIENCY: ICD-10-CM

## 2024-08-27 DIAGNOSIS — Z00.00 ANNUAL PHYSICAL EXAM: ICD-10-CM

## 2024-08-27 DIAGNOSIS — R73.03 PREDIABETES: ICD-10-CM

## 2024-08-27 DIAGNOSIS — E78.2 MIXED HYPERLIPIDEMIA: ICD-10-CM

## 2024-08-27 DIAGNOSIS — Z12.5 PROSTATE CANCER SCREENING: ICD-10-CM

## 2024-12-19 ENCOUNTER — OFFICE VISIT (OUTPATIENT)
Age: 59
End: 2024-12-19
Payer: COMMERCIAL

## 2024-12-19 VITALS
OXYGEN SATURATION: 98 % | RESPIRATION RATE: 19 BRPM | HEART RATE: 64 BPM | DIASTOLIC BLOOD PRESSURE: 80 MMHG | SYSTOLIC BLOOD PRESSURE: 120 MMHG | BODY MASS INDEX: 33.49 KG/M2 | WEIGHT: 201 LBS | HEIGHT: 65 IN

## 2024-12-19 DIAGNOSIS — R89.9 ABNORMAL LABORATORY TEST RESULT: Primary | ICD-10-CM

## 2024-12-19 DIAGNOSIS — R73.03 PREDIABETES: ICD-10-CM

## 2024-12-19 DIAGNOSIS — E66.9 OBESITY (BMI 30-39.9): ICD-10-CM

## 2024-12-19 DIAGNOSIS — E78.2 MIXED HYPERLIPIDEMIA: ICD-10-CM

## 2024-12-19 LAB
25(OH)D3+25(OH)D2 SERPL-MCNC: 33.2 NG/ML (ref 30–100)
ALBUMIN SERPL-MCNC: 4.3 G/DL (ref 3.8–4.9)
ALP SERPL-CCNC: 88 IU/L (ref 44–121)
ALT SERPL-CCNC: 26 IU/L (ref 0–44)
AST SERPL-CCNC: 29 IU/L (ref 0–40)
BASOPHILS # BLD AUTO: 0 X10E3/UL (ref 0–0.2)
BASOPHILS NFR BLD AUTO: 1 %
BILIRUB SERPL-MCNC: 0.7 MG/DL (ref 0–1.2)
BUN SERPL-MCNC: 15 MG/DL (ref 6–24)
BUN/CREAT SERPL: 16 (ref 9–20)
CALCIUM SERPL-MCNC: 10 MG/DL (ref 8.7–10.2)
CHLORIDE SERPL-SCNC: 102 MMOL/L (ref 96–106)
CHOLEST SERPL-MCNC: 319 MG/DL (ref 100–199)
CO2 SERPL-SCNC: 24 MMOL/L (ref 20–29)
CREAT SERPL-MCNC: 0.96 MG/DL (ref 0.76–1.27)
EGFRCR SERPLBLD CKD-EPI 2021: 92 ML/MIN/1.73
EOSINOPHIL # BLD AUTO: 0.2 X10E3/UL (ref 0–0.4)
EOSINOPHIL NFR BLD AUTO: 3 %
ERYTHROCYTE [DISTWIDTH] IN BLOOD BY AUTOMATED COUNT: 12.8 % (ref 11.6–15.4)
GLOBULIN SER CALC-MCNC: 2.6 G/DL (ref 1.5–4.5)
GLUCOSE SERPL-MCNC: 105 MG/DL (ref 70–99)
HBA1C MFR BLD: 5.8 % (ref 4.8–5.6)
HCT VFR BLD AUTO: 48 % (ref 37.5–51)
HDLC SERPL-MCNC: 52 MG/DL
HGB BLD-MCNC: 16.2 G/DL (ref 13–17.7)
IMM GRANULOCYTES # BLD AUTO: 0 X10E3/UL (ref 0–0.1)
IMM GRANULOCYTES NFR BLD AUTO: 0 %
IMP & REVIEW OF LAB RESULTS: NORMAL
LDLC SERPL CALC-MCNC: 194 MG/DL (ref 0–99)
LYMPHOCYTES # BLD AUTO: 1.9 X10E3/UL (ref 0.7–3.1)
LYMPHOCYTES NFR BLD AUTO: 27 %
Lab: ABNORMAL
MCH RBC QN AUTO: 31.4 PG (ref 26.6–33)
MCHC RBC AUTO-ENTMCNC: 33.8 G/DL (ref 31.5–35.7)
MCV RBC AUTO: 93 FL (ref 79–97)
MONOCYTES # BLD AUTO: 0.5 X10E3/UL (ref 0.1–0.9)
MONOCYTES NFR BLD AUTO: 7 %
NEUTROPHILS # BLD AUTO: 4.3 X10E3/UL (ref 1.4–7)
NEUTROPHILS NFR BLD AUTO: 62 %
PLATELET # BLD AUTO: 239 X10E3/UL (ref 150–450)
POTASSIUM SERPL-SCNC: 4.2 MMOL/L (ref 3.5–5.2)
PROT SERPL-MCNC: 6.9 G/DL (ref 6–8.5)
PSA SERPL-MCNC: 3 NG/ML (ref 0–4)
RBC # BLD AUTO: 5.16 X10E6/UL (ref 4.14–5.8)
SODIUM SERPL-SCNC: 141 MMOL/L (ref 134–144)
TRIGL SERPL-MCNC: 365 MG/DL (ref 0–149)
TSH SERPL DL<=0.005 MIU/L-ACNC: 1.46 UIU/ML (ref 0.45–4.5)
VLDLC SERPL CALC-MCNC: 73 MG/DL (ref 5–40)
WBC # BLD AUTO: 6.9 X10E3/UL (ref 3.4–10.8)

## 2024-12-19 PROCEDURE — G8484 FLU IMMUNIZE NO ADMIN: HCPCS | Performed by: INTERNAL MEDICINE

## 2024-12-19 PROCEDURE — G8427 DOCREV CUR MEDS BY ELIG CLIN: HCPCS | Performed by: INTERNAL MEDICINE

## 2024-12-19 PROCEDURE — 99214 OFFICE O/P EST MOD 30 MIN: CPT | Performed by: INTERNAL MEDICINE

## 2024-12-19 PROCEDURE — 3017F COLORECTAL CA SCREEN DOC REV: CPT | Performed by: INTERNAL MEDICINE

## 2024-12-19 PROCEDURE — 1036F TOBACCO NON-USER: CPT | Performed by: INTERNAL MEDICINE

## 2024-12-19 PROCEDURE — G8417 CALC BMI ABV UP PARAM F/U: HCPCS | Performed by: INTERNAL MEDICINE

## 2024-12-19 RX ORDER — METFORMIN HYDROCHLORIDE 500 MG/1
500 TABLET, EXTENDED RELEASE ORAL
Qty: 90 TABLET | Refills: 1 | Status: SHIPPED | OUTPATIENT
Start: 2024-12-19

## 2024-12-19 RX ORDER — ROSUVASTATIN CALCIUM 40 MG/1
40 TABLET, COATED ORAL DAILY
Qty: 90 TABLET | Refills: 1 | Status: SHIPPED | OUTPATIENT
Start: 2024-12-19

## 2024-12-19 NOTE — PROGRESS NOTES
Chief Complaint   Patient presents with    Discuss Labs     \"Have you been to the ER, urgent care clinic since your last visit?  Hospitalized since your last visit?\"    NO    “Have you seen or consulted any other health care providers outside our system since your last visit?”    NO      “Have you had a colorectal cancer screening such as a colonoscopy/FIT/Cologuard?    Not sure    Date of last Colonoscopy: 10/15/2019  No cologuard on file  No FIT/FOBT on file   No flexible sigmoidoscopy on file            
potatoes, starches, and sugars. A prescription for metformin 500 mg once daily, extended release, has been issued to help manage prediabetic levels. He is advised to monitor his blood sugar levels and report any significant changes.    2. Hypercholesterolemia.  Cholesterol levels are significantly elevated, with a total cholesterol of 319, LDL at 194, and triglycerides at 365. A prescription for Crestor 40 mg nightly has been issued to help control cholesterol levels. He is advised to take the medication at night for better absorption and to avoid potential interactions with food and other medications. A recheck of cholesterol levels will be scheduled in March.    3. Vitamin D deficiency.  Vitamin D level is at the lower end of normal at 33. Over-the-counter vitamin D supplementation is recommended to maintain adequate levels.    4. Weight management.  Various weight loss medications, including Zepbound and Wegovy, were discussed. He is advised to check with his insurance company regarding coverage for these medications, especially with the upcoming new year. If insurance covers the medication, further steps will be taken to initiate treatment.    Jeremy was seen today for discuss labs.    Diagnoses and all orders for this visit:    Abnormal laboratory test result    Obesity (BMI 30-39.9)    Prediabetes  -     metFORMIN (GLUCOPHAGE-XR) 500 MG extended release tablet; Take 1 tablet by mouth daily (with breakfast)    Mixed hyperlipidemia  -     rosuvastatin (CRESTOR) 40 MG tablet; Take 1 tablet by mouth daily      Reviewed with patient medication side effects in detail   I answered all patient questions and concerns  Labs and testing done and/or upcoming labs/test were reviewed and discussed   Reviewed and discussed daily activity, exercise and diet      Return in about 6 months (around 6/19/2025) for follow up if symptoms persist, follow up for routine visit or before if needed.     CHRYSTAL Lopez - NP      The

## 2024-12-30 ENCOUNTER — TELEPHONE (OUTPATIENT)
Age: 59
End: 2024-12-30

## 2024-12-30 NOTE — TELEPHONE ENCOUNTER
Jeremy Cassidy was called with no answer, message on  left to call back regarding note below.     ----- Message from CHRYSTAL Henry NP sent at 12/22/2024 10:55 AM EST -----  Lipids are not controlled. Is patient taking crestor ? If so, please add zetia 10 mg daily and review a mediterranean like diet . Repeat lipids in 3 months     Is a pre DM. Be sure diet is low carb, low sugar diet    All other labs stable

## 2024-12-31 ENCOUNTER — TELEPHONE (OUTPATIENT)
Age: 59
End: 2024-12-31

## 2024-12-31 NOTE — TELEPHONE ENCOUNTER
----- Message from CHRYSTAL Henry NP sent at 12/22/2024 10:55 AM EST -----  Lipids are not controlled. Is patient taking crestor ? If so, please add zetia 10 mg daily and review a mediterranean like diet . Repeat lipids in 3 months     Is a pre DM. Be sure diet is low carb, low sugar diet    All other labs stable

## 2025-01-08 ENCOUNTER — TELEPHONE (OUTPATIENT)
Age: 60
End: 2025-01-08

## 2025-01-08 DIAGNOSIS — E78.2 MIXED HYPERLIPIDEMIA: Primary | ICD-10-CM

## 2025-01-08 DIAGNOSIS — E78.1 HYPERTRIGLYCERIDEMIA: ICD-10-CM

## 2025-01-08 RX ORDER — SEMAGLUTIDE 0.25 MG/.5ML
0.25 INJECTION, SOLUTION SUBCUTANEOUS
Qty: 2 ML | Refills: 1 | Status: SHIPPED | OUTPATIENT
Start: 2025-01-08

## 2025-01-08 NOTE — TELEPHONE ENCOUNTER
Pt requesting a call back to discuss prior authorization process for Wegovy.     Phone: [phone]

## 2025-02-10 ENCOUNTER — PATIENT MESSAGE (OUTPATIENT)
Age: 60
End: 2025-02-10

## 2025-02-13 ENCOUNTER — TELEPHONE (OUTPATIENT)
Age: 60
End: 2025-02-13

## 2025-03-11 ENCOUNTER — PATIENT MESSAGE (OUTPATIENT)
Age: 60
End: 2025-03-11

## 2025-03-18 SDOH — ECONOMIC STABILITY: INCOME INSECURITY: IN THE LAST 12 MONTHS, WAS THERE A TIME WHEN YOU WERE NOT ABLE TO PAY THE MORTGAGE OR RENT ON TIME?: NO

## 2025-03-18 SDOH — ECONOMIC STABILITY: FOOD INSECURITY: WITHIN THE PAST 12 MONTHS, YOU WORRIED THAT YOUR FOOD WOULD RUN OUT BEFORE YOU GOT MONEY TO BUY MORE.: NEVER TRUE

## 2025-03-18 SDOH — ECONOMIC STABILITY: TRANSPORTATION INSECURITY
IN THE PAST 12 MONTHS, HAS THE LACK OF TRANSPORTATION KEPT YOU FROM MEDICAL APPOINTMENTS OR FROM GETTING MEDICATIONS?: NO

## 2025-03-18 SDOH — ECONOMIC STABILITY: FOOD INSECURITY: WITHIN THE PAST 12 MONTHS, THE FOOD YOU BOUGHT JUST DIDN'T LAST AND YOU DIDN'T HAVE MONEY TO GET MORE.: NEVER TRUE

## 2025-03-18 SDOH — ECONOMIC STABILITY: TRANSPORTATION INSECURITY
IN THE PAST 12 MONTHS, HAS LACK OF TRANSPORTATION KEPT YOU FROM MEETINGS, WORK, OR FROM GETTING THINGS NEEDED FOR DAILY LIVING?: NO

## 2025-03-19 ENCOUNTER — TELEMEDICINE (OUTPATIENT)
Age: 60
End: 2025-03-19
Payer: COMMERCIAL

## 2025-03-19 DIAGNOSIS — F32.A ANXIETY AND DEPRESSION: Primary | ICD-10-CM

## 2025-03-19 DIAGNOSIS — R73.03 PREDIABETES: ICD-10-CM

## 2025-03-19 DIAGNOSIS — F41.9 ANXIETY AND DEPRESSION: Primary | ICD-10-CM

## 2025-03-19 DIAGNOSIS — E78.2 MIXED HYPERLIPIDEMIA: ICD-10-CM

## 2025-03-19 PROCEDURE — G8427 DOCREV CUR MEDS BY ELIG CLIN: HCPCS | Performed by: INTERNAL MEDICINE

## 2025-03-19 PROCEDURE — 99214 OFFICE O/P EST MOD 30 MIN: CPT | Performed by: INTERNAL MEDICINE

## 2025-03-19 PROCEDURE — 3017F COLORECTAL CA SCREEN DOC REV: CPT | Performed by: INTERNAL MEDICINE

## 2025-03-19 PROCEDURE — 1036F TOBACCO NON-USER: CPT | Performed by: INTERNAL MEDICINE

## 2025-03-19 PROCEDURE — G8417 CALC BMI ABV UP PARAM F/U: HCPCS | Performed by: INTERNAL MEDICINE

## 2025-03-19 RX ORDER — ROSUVASTATIN CALCIUM 40 MG/1
40 TABLET, COATED ORAL DAILY
Qty: 90 TABLET | Refills: 1 | Status: SHIPPED | OUTPATIENT
Start: 2025-03-19

## 2025-03-19 RX ORDER — METFORMIN HYDROCHLORIDE 500 MG/1
500 TABLET, EXTENDED RELEASE ORAL
Qty: 90 TABLET | Refills: 1 | Status: SHIPPED | OUTPATIENT
Start: 2025-03-19

## 2025-03-19 RX ORDER — LAMOTRIGINE 25 MG/1
25 TABLET ORAL DAILY
Qty: 90 TABLET | Refills: 1 | Status: SHIPPED | OUTPATIENT
Start: 2025-03-19

## 2025-03-19 ASSESSMENT — PATIENT HEALTH QUESTIONNAIRE - PHQ9
SUM OF ALL RESPONSES TO PHQ QUESTIONS 1-9: 12
4. FEELING TIRED OR HAVING LITTLE ENERGY: SEVERAL DAYS
9. THOUGHTS THAT YOU WOULD BE BETTER OFF DEAD, OR OF HURTING YOURSELF: NOT AT ALL
3. TROUBLE FALLING OR STAYING ASLEEP: NEARLY EVERY DAY
10. IF YOU CHECKED OFF ANY PROBLEMS, HOW DIFFICULT HAVE THESE PROBLEMS MADE IT FOR YOU TO DO YOUR WORK, TAKE CARE OF THINGS AT HOME, OR GET ALONG WITH OTHER PEOPLE: SOMEWHAT DIFFICULT
5. POOR APPETITE OR OVEREATING: SEVERAL DAYS
8. MOVING OR SPEAKING SO SLOWLY THAT OTHER PEOPLE COULD HAVE NOTICED. OR THE OPPOSITE, BEING SO FIGETY OR RESTLESS THAT YOU HAVE BEEN MOVING AROUND A LOT MORE THAN USUAL: NOT AT ALL
6. FEELING BAD ABOUT YOURSELF - OR THAT YOU ARE A FAILURE OR HAVE LET YOURSELF OR YOUR FAMILY DOWN: NOT AT ALL
2. FEELING DOWN, DEPRESSED OR HOPELESS: MORE THAN HALF THE DAYS
1. LITTLE INTEREST OR PLEASURE IN DOING THINGS: MORE THAN HALF THE DAYS
7. TROUBLE CONCENTRATING ON THINGS, SUCH AS READING THE NEWSPAPER OR WATCHING TELEVISION: NEARLY EVERY DAY

## 2025-03-19 ASSESSMENT — ANXIETY QUESTIONNAIRES
5. BEING SO RESTLESS THAT IT IS HARD TO SIT STILL: SEVERAL DAYS
GAD7 TOTAL SCORE: 6
7. FEELING AFRAID AS IF SOMETHING AWFUL MIGHT HAPPEN: NOT AT ALL
1. FEELING NERVOUS, ANXIOUS, OR ON EDGE: SEVERAL DAYS
IF YOU CHECKED OFF ANY PROBLEMS ON THIS QUESTIONNAIRE, HOW DIFFICULT HAVE THESE PROBLEMS MADE IT FOR YOU TO DO YOUR WORK, TAKE CARE OF THINGS AT HOME, OR GET ALONG WITH OTHER PEOPLE: SOMEWHAT DIFFICULT
3. WORRYING TOO MUCH ABOUT DIFFERENT THINGS: SEVERAL DAYS
4. TROUBLE RELAXING: SEVERAL DAYS
2. NOT BEING ABLE TO STOP OR CONTROL WORRYING: SEVERAL DAYS
6. BECOMING EASILY ANNOYED OR IRRITABLE: SEVERAL DAYS

## 2025-03-19 ASSESSMENT — ENCOUNTER SYMPTOMS: RESPIRATORY NEGATIVE: 1

## 2025-03-19 NOTE — PROGRESS NOTES
3/19/2025    TELEHEALTH EVALUATION -- Audio/Visual    HPI:    Jeremy Cassidy (:  1965) has requested an audio/video evaluation for the following concern(s):    Patient was seen via video. Reports that he has had concerns with anxiety, depression and feeling as though his mood is not controlled. Often times effecting his sleep, relationships and job.  Previously was seen by psych but has not in a few years. Was on Lamitcal in the past and this was helpful.  Also at the time was taking Vyvanse for her ADD and the combination worked well.     He has found it difficult to lose weight and has not been able to get on medications due to coverage. Has been trying to diet and change lifestyle habits     Review of Systems   Constitutional: Negative.    Respiratory: Negative.     Cardiovascular: Negative.    Psychiatric/Behavioral:  Positive for decreased concentration and sleep disturbance. The patient is nervous/anxious.        Prior to Visit Medications    Medication Sig Taking? Authorizing Provider   lamoTRIgine (LAMICTAL) 25 MG tablet Take 1 tablet by mouth daily Yes Aminata Smith APRN - NP   metFORMIN (GLUCOPHAGE-XR) 500 MG extended release tablet Take 1 tablet by mouth daily (with breakfast) Yes Aminata Smith APRN - NP   rosuvastatin (CRESTOR) 40 MG tablet Take 1 tablet by mouth daily Yes Aminata Smith APRN - NP   Dextromethorphan-guaiFENesin (MUCINEX DM)  MG TB12 1 twice daily Yes Grant Hitchcock DO   cetirizine (ZYRTEC) 10 MG tablet Take 1 tablet by mouth nightly as needed for Allergies Yes Bella Orantes MD   fluticasone (FLONASE) 50 MCG/ACT nasal spray 1 spray by Each Nostril route daily Yes Bella Orantes MD   albuterol sulfate HFA (VENTOLIN HFA) 108 (90 Base) MCG/ACT inhaler Inhale 2 puffs into the lungs 4 times daily as needed for Wheezing or Shortness of Breath Yes Sayda Painter APRN - CNP   sildenafil (VIAGRA) 100 MG tablet Take 1 tablet by mouth daily as needed Yes [employer]

## 2025-03-19 NOTE — PROGRESS NOTES
Chief Complaint   Patient presents with    Depression    Follow-up    Discuss Medications     \"Have you been to the ER, urgent care clinic since your last visit?  Hospitalized since your last visit?\"    NO    “Have you seen or consulted any other health care providers outside our system since your last visit?”    NO    “Have you had a colorectal cancer screening such as a colonoscopy/FIT/Cologuard?    NO    Date of last Colonoscopy: 10/15/2019  No cologuard on file  No FIT/FOBT on file   No flexible sigmoidoscopy on file

## 2025-05-30 ENCOUNTER — OFFICE VISIT (OUTPATIENT)
Age: 60
End: 2025-05-30
Payer: COMMERCIAL

## 2025-05-30 VITALS
BODY MASS INDEX: 34.32 KG/M2 | WEIGHT: 206 LBS | SYSTOLIC BLOOD PRESSURE: 120 MMHG | HEART RATE: 76 BPM | DIASTOLIC BLOOD PRESSURE: 80 MMHG | RESPIRATION RATE: 18 BRPM | HEIGHT: 65 IN | OXYGEN SATURATION: 98 %

## 2025-05-30 DIAGNOSIS — E55.9 VITAMIN D DEFICIENCY: ICD-10-CM

## 2025-05-30 DIAGNOSIS — R73.03 PREDIABETES: ICD-10-CM

## 2025-05-30 DIAGNOSIS — R53.83 LACK OF ENERGY: ICD-10-CM

## 2025-05-30 DIAGNOSIS — E78.2 MIXED HYPERLIPIDEMIA: ICD-10-CM

## 2025-05-30 DIAGNOSIS — K42.9 UMBILICAL HERNIA WITHOUT OBSTRUCTION AND WITHOUT GANGRENE: Primary | ICD-10-CM

## 2025-05-30 PROCEDURE — 99214 OFFICE O/P EST MOD 30 MIN: CPT | Performed by: INTERNAL MEDICINE

## 2025-05-30 PROCEDURE — 3017F COLORECTAL CA SCREEN DOC REV: CPT | Performed by: INTERNAL MEDICINE

## 2025-05-30 PROCEDURE — 1036F TOBACCO NON-USER: CPT | Performed by: INTERNAL MEDICINE

## 2025-05-30 PROCEDURE — G8417 CALC BMI ABV UP PARAM F/U: HCPCS | Performed by: INTERNAL MEDICINE

## 2025-05-30 PROCEDURE — G8427 DOCREV CUR MEDS BY ELIG CLIN: HCPCS | Performed by: INTERNAL MEDICINE

## 2025-05-30 ASSESSMENT — ENCOUNTER SYMPTOMS
GASTROINTESTINAL NEGATIVE: 1
RESPIRATORY NEGATIVE: 1

## 2025-05-30 NOTE — PROGRESS NOTES
Have you been to the ER, urgent care clinic since your last visit?  Hospitalized since your last visit?   NO    Have you seen or consulted any other health care providers outside our system since your last visit?   NO    “Have you had a colorectal cancer screening such as a colonoscopy/FIT/Cologuard?    NO    Date of last Colonoscopy: 10/15/2019  No cologuard on file  No FIT/FOBT on file   No flexible sigmoidoscopy on file            
rhythm, no murmurs, rubs, or gallops  Gastrointestinal: Umbilical hernia noted      Assessment & Plan    Assessment & Plan  1. Umbilical hernia.  - The patient has an umbilical hernia that is not currently causing any symptoms such as nausea, vomiting, or pain.  - The hernia is more pronounced due to gastrointestinal-related changes.  - He was informed that the most common complication of an untreated hernia is gangrene, which would present with significant pain.  - A referral to a general surgeon will be provided for further evaluation and potential elective surgery.    2. Prediabetes.  - His last blood work 6 months ago indicated prediabetes.  - A lab order will be placed to monitor his prediabetic range.  - He is advised to follow up with the results. Low carb, low sugar diet   - Routine monitoring of blood glucose levels will be continued.    3. Hyperlipidemia.  - His lipid panel 6 months ago showed uncontrolled cholesterol levels.  - He is currently on rosuvastatin was not taking this at the time of last labs   - A lab order will be placed to monitor his cholesterol levels.  - He is advised to continue taking his cholesterol medication and follow up with the results.    4. Health maintenance.  - He is due for a colonoscopy, as his last one was performed in 10/2019, during which a 1.5 cm polyp was removed from the ascending colon and hemorrhoids were identified.  - He is advised to contact Dr. Le's office to schedule the procedure.  - A lab order will be placed to check for signs of anemia, thyroid function, testosterone levels, and vitamin D levels.  - Routine health maintenance will include monitoring these lab values and addressing any abnormalities.    Diagnoses and all orders for this visit:    Umbilical hernia without obstruction and without gangrene  -     [facility] - Mich Kemp MD, General SurgerySameer ([address])    Prediabetes  -     Hemoglobin A1C; Future    Mixed hyperlipidemia  -     Lipid